# Patient Record
Sex: MALE | Race: WHITE | NOT HISPANIC OR LATINO | Employment: OTHER
[De-identification: names, ages, dates, MRNs, and addresses within clinical notes are randomized per-mention and may not be internally consistent; named-entity substitution may affect disease eponyms.]

---

## 2020-03-10 ENCOUNTER — HEALTH MAINTENANCE LETTER (OUTPATIENT)
Age: 65
End: 2020-03-10

## 2020-12-27 ENCOUNTER — HEALTH MAINTENANCE LETTER (OUTPATIENT)
Age: 65
End: 2020-12-27

## 2021-04-24 ENCOUNTER — HEALTH MAINTENANCE LETTER (OUTPATIENT)
Age: 66
End: 2021-04-24

## 2021-10-04 ENCOUNTER — HEALTH MAINTENANCE LETTER (OUTPATIENT)
Age: 66
End: 2021-10-04

## 2022-05-15 ENCOUNTER — HEALTH MAINTENANCE LETTER (OUTPATIENT)
Age: 67
End: 2022-05-15

## 2022-09-11 ENCOUNTER — HEALTH MAINTENANCE LETTER (OUTPATIENT)
Age: 67
End: 2022-09-11

## 2023-06-03 ENCOUNTER — HEALTH MAINTENANCE LETTER (OUTPATIENT)
Age: 68
End: 2023-06-03

## 2024-02-09 ENCOUNTER — TELEPHONE (OUTPATIENT)
Dept: NEUROSURGERY | Facility: CLINIC | Age: 69
End: 2024-02-09

## 2024-02-23 ASSESSMENT — HOOS JR
HOOS JR TOTAL INTERVAL SCORE: 43.34
BENDING TO THE FLOOR TO PICK UP OBJECT: SEVERE
GOING UP OR DOWN STAIRS: SEVERE
RISING FROM SITTING: MODERATE
WALKING ON UNEVEN SURFACE: SEVERE
SITTING: MODERATE
LYING IN BED (TURNING OVER, MAINTAINING HIP POSITION): MODERATE

## 2024-02-27 NOTE — TELEPHONE ENCOUNTER
Action February 27, 2024 10:08 AM MT   Action Taken Sent a request for imaging from .     DIAGNOSIS: HIP - 2ND OPINION   APPOINTMENT DATE: 02/29/2024   NOTES STATUS DETAILS   OFFICE NOTE from referring provider SELF    OFFICE NOTE from other specialist Care Everywhere  Orthopedics   Neurosurgery   Pain  More..   OPERATIVE REPORT Care Everywhere 11/28/2017 - RT TKA   LABS     INJECTIONS DONE IN RADIOLOGY PACS [facility]:  12/18/2023, 11/13/2023 - Hip    XRAYS (IMAGES & REPORTS) PACS HP:  02/02/2024, 05/24/2022 - Pelvis  12/28/2018 - L Spine

## 2024-02-29 ENCOUNTER — OFFICE VISIT (OUTPATIENT)
Dept: ORTHOPEDICS | Facility: CLINIC | Age: 69
End: 2024-02-29
Payer: COMMERCIAL

## 2024-02-29 ENCOUNTER — PRE VISIT (OUTPATIENT)
Dept: ORTHOPEDICS | Facility: CLINIC | Age: 69
End: 2024-02-29

## 2024-02-29 VITALS — WEIGHT: 180 LBS | BODY MASS INDEX: 25.2 KG/M2 | HEIGHT: 71 IN

## 2024-02-29 DIAGNOSIS — M16.11 PRIMARY LOCALIZED OSTEOARTHRITIS OF RIGHT HIP: Primary | ICD-10-CM

## 2024-02-29 PROCEDURE — 99204 OFFICE O/P NEW MOD 45 MIN: CPT | Performed by: ORTHOPAEDIC SURGERY

## 2024-02-29 NOTE — NURSING NOTE
"Reason For Visit:   Chief Complaint   Patient presents with    Consult     Right hip pain for about a year. Mostly lateral. Groin pain when he rides stationary bike. Radiates into thigh and knee. SI injection and lateral injection - neither helped at all. Spine surgeon said surgery was not indicated.        Ht 1.803 m (5' 11\")   Wt 81.6 kg (180 lb)   BMI 25.10 kg/m           Johana Cordero ATC    "

## 2024-02-29 NOTE — LETTER
2/29/2024         RE: Js Hester  [address] [place] [address]        Dear Colleague,    Thank you for referring your patient, Js Hester, to the [facility] [place]. Please see a copy of my visit note below.    Assessment: This is a 68-year-old male with advanced right hip osteoarthritis.  His symptoms have been long-term and management has included an SI joint injection trochanteric injection and a spine workup.  Given the severity of his symptoms and the advanced nature of his osteoarthritis I do not believe an injection or physical therapy are going to provide meaningful midterm benefit for him.  We discussed the diagnosis and the treatment options.  We discussed the treatment of his hip would be for his groin symptoms.We discussed that given the level of symptoms and radiographic changes that further non-operative management in the form injection and/or physical therapist directed exercises is not mandatory. We spent twenty minutes discussing total hip arthroplasty.  We discussed the implants, the procedure, the risks and benefits, and the post-operative course.  We discussed blood clots, blood clots to the lungs, injury to blood vessels and nerves, dislocation, infection, and leg length difference.  We discussed that as part of the routine part of surgical care a qualified assist may finish closing the wound after I have left the room. All the patients questions were answered to the best of my ability.    Plan: Right total hip arthroplasty with the patient choose to go forward with it      Chief Complaint: Consult (Right hip pain for about a year. Mostly lateral. Groin pain when he rides stationary bike. Radiates into thigh and knee. SI injection and lateral injection - neither helped at all. Spine surgeon said surgery was not indicated. )      Physician:  No ref. provider found    HPI: Js Hester is a 68 year old male who presents today for  evaluation of of his right hip    Location of symptoms: Buttock, lateral hip and into the groin.  Onset: Insidious  Duration of symptoms: About a year  Quality of symptoms: Aching and sharp  Severity: Severe  Alleviating: activity modification  Exacerbating: activities  Previous Treatments: Previous treatments include activity modification, oral pain medication, trochanteric injection, SI joint injection.    RASHEEDA Jr: 43.34  PROMIS Mental:    PROMIS Physical:    PROMIS Total:    UCLA Activity Scale:    MEDICAL HISTORY:   Past Medical History:   Diagnosis Date    Arthritis     Ascending aorta dilation (H24)     per 11/3/15 stress echo, moderate dilated asc aorta of 4.4 cm. Mildly dilated aortic root 4.2 cm.    Coronary artery disease     per 10/16 CT calcium score--total score of 304, predominantly in the distribution of the left coronary    Hyperlipidaemia        Pertinent negatives:  Patient has no history of DVT or PE. Discussed risk factors.    Medications:     Current Outpatient Medications:     aspirin EC 81 MG tablet, Take 1 tablet (81 mg) by mouth daily, Disp: , Rfl:     atorvastatin (LIPITOR) 10 MG tablet, Take 1 tablet (10 mg) by mouth At Bedtime, Disp: 90 tablet, Rfl: 4    Cholecalciferol (VITAMIN D3) 2000 UNITS TABS, Take by mouth daily as needed, Disp: , Rfl:     Allergies: Patient has no known allergies.    SURGICAL HISTORY: No past surgical history on file.    HISTORY:   Family History   Problem Relation Age of Onset    Diabetes Father     Hyperlipidemia Sister     Diabetes Sister     Coronary Artery Disease Paternal Half-Sister     Hyperlipidemia Sister     Cerebrovascular Disease Sister        SOCIAL HISTORY:   Social History     Tobacco Use    Smoking status: Never    Smokeless tobacco: Not on file   Substance Use Topics    Alcohol use: Yes     Alcohol/week: 0.0 standard drinks of alcohol     Comment: 3 drinks, beer and wine, 4 days week       REVIEW OF SYSTEMS:  The comprehensive review of systems  "from the intake form was reviewed with the patient.  No fever, weight change or fatigue. No dry eyes. No oral ulcers, sore throat or voice change. No palpitations, syncope, angina or edema.  No chest pain, excessive sleepiness, shortness of breath or hemoptysis.   No abdominal pain, nausea, vomiting, diarrhea or heartburn.  No skin rash. No focal weakness or numbness. No bleeding or lymphadenopathy. No rhinitis or hives.     Exam:  On physical examination the patient appears the stated age, is in no acute distress, alert and oriented, affect is appropriate, and breathing is non-labored.  Vitals are documented in the EMR and have been reviewed:    Ht 1.803 m (5' 11\")   Wt 81.6 kg (180 lb)   BMI 25.10 kg/m    5' 11\"  Body mass index is 25.1 kg/m .    Rises from chair: Easily  Gait: Mild antalgic with less time in the right  Trendelenburg test:  Gains the exam table: Easily    RIGHT hip subjective: A little irritated  Abd: 20  Add: 10  Flexion: 90  IRF: -10  ERF: 30  Impingement test: Positive groin  Tenderness to palpation: Trochanter right side only    LEFT hip subjective:  Abd:  Add:  Flexion: 100  IRF: 0  ERF: 35  Impingement test: Negative  Tenderness to palpation no    Distal the circulatory, motor, and sensation exam is intact with 5/5 EHL, gastroc-soleus, and tibialis anterior.  Sensation to light touch is intact.  Dorsalis pedis and posterior tibialis pulses are palpable.  There are no sores on the feet, no bruising, and no lymphedema.    Imaging:     XR Pelvis AP W AP/Lat Hip Rt  Order: [id number]  Narrative    EXAM: XR PELVIS AP W AP/LAT HIP RT  LOCATION:  [facility]  DATE: 2/2/2024    INDICATION: Right hip pain.  COMPARISON: 05/24/2022.    IMPRESSION: No radiographic evidence of acute fracture or malalignment. Moderate to severe right hip joint osteoarthritis. Mild osteoarthritis of the pubic symphysis. Mild to moderate osteoarthritis of the left hip joint and bilateral sacroiliac joints.  Exam " End: 02/02/24  9:05 AM    Specimen Collected: 02/02/24  9:05 AM Last Resulted: 02/02/24 12:41 PM   Received From: [facility]  Result Received: 02/27/24 10:03 AM        Marvin Mendoza MD

## 2024-03-05 ENCOUNTER — TELEPHONE (OUTPATIENT)
Dept: ORTHOPEDICS | Facility: CLINIC | Age: 69
End: 2024-03-05

## 2024-03-05 NOTE — TELEPHONE ENCOUNTER
Date Scheduled: 4/11/24  Facility: Surgery Locations: [facility]  Surgeon: Dr. Mendoza   Post-op appointment scheduled:    scheduled?: No  Surgery packet/instructions confirmed received?  Yes  Pre op physical/PAC appointment: Given phone number for PAC  Special Considerations:     Patient is interested in Fast Track as he would like to go home same day.     Yazmin Moore  Surgery Scheduling Coordinator  Ph: [phone]

## 2024-03-15 NOTE — PROGRESS NOTES
Assessment: This is a 68-year-old male with advanced right hip osteoarthritis.  His symptoms have been long-term and management has included an SI joint injection trochanteric injection and a spine workup.  Given the severity of his symptoms and the advanced nature of his osteoarthritis I do not believe an injection or physical therapy are going to provide meaningful midterm benefit for him.  We discussed the diagnosis and the treatment options.  We discussed the treatment of his hip would be for his groin symptoms.We discussed that given the level of symptoms and radiographic changes that further non-operative management in the form injection and/or physical therapist directed exercises is not mandatory. We spent twenty minutes discussing total hip arthroplasty.  We discussed the implants, the procedure, the risks and benefits, and the post-operative course.  We discussed blood clots, blood clots to the lungs, injury to blood vessels and nerves, dislocation, infection, and leg length difference.  We discussed that as part of the routine part of surgical care a qualified assist may finish closing the wound after I have left the room. All the patients questions were answered to the best of my ability.    Plan: Right total hip arthroplasty with the patient choose to go forward with it      Chief Complaint: Consult (Right hip pain for about a year. Mostly lateral. Groin pain when he rides stationary bike. Radiates into thigh and knee. SI injection and lateral injection - neither helped at all. Spine surgeon said surgery was not indicated. )      Physician:  No ref. provider found    HPI: Js Hester is a 68 year old male who presents today for evaluation of of his right hip    Location of symptoms: Buttock, lateral hip and into the groin.  Onset: Insidious  Duration of symptoms: About a year  Quality of symptoms: Aching and sharp  Severity: Severe  Alleviating: activity modification  Exacerbating:  activities  Previous Treatments: Previous treatments include activity modification, oral pain medication, trochanteric injection, SI joint injection.    HOOS Jr: 43.34  PROMIS Mental:    PROMIS Physical:    PROMIS Total:    UCLA Activity Scale:    MEDICAL HISTORY:   Past Medical History:   Diagnosis Date    Arthritis     Ascending aorta dilation (H24)     per 11/3/15 stress echo, moderate dilated asc aorta of 4.4 cm. Mildly dilated aortic root 4.2 cm.    Coronary artery disease     per 10/16 CT calcium score--total score of 304, predominantly in the distribution of the left coronary    Hyperlipidaemia        Pertinent negatives:  Patient has no history of DVT or PE. Discussed risk factors.    Medications:     Current Outpatient Medications:     aspirin EC 81 MG tablet, Take 1 tablet (81 mg) by mouth daily, Disp: , Rfl:     atorvastatin (LIPITOR) 10 MG tablet, Take 1 tablet (10 mg) by mouth At Bedtime, Disp: 90 tablet, Rfl: 4    Cholecalciferol (VITAMIN D3) 2000 UNITS TABS, Take by mouth daily as needed, Disp: , Rfl:     Allergies: Patient has no known allergies.    SURGICAL HISTORY: No past surgical history on file.    HISTORY:   Family History   Problem Relation Age of Onset    Diabetes Father     Hyperlipidemia Sister     Diabetes Sister     Coronary Artery Disease Paternal Half-Sister     Hyperlipidemia Sister     Cerebrovascular Disease Sister        SOCIAL HISTORY:   Social History     Tobacco Use    Smoking status: Never    Smokeless tobacco: Not on file   Substance Use Topics    Alcohol use: Yes     Alcohol/week: 0.0 standard drinks of alcohol     Comment: 3 drinks, beer and wine, 4 days week       REVIEW OF SYSTEMS:  The comprehensive review of systems from the intake form was reviewed with the patient.  No fever, weight change or fatigue. No dry eyes. No oral ulcers, sore throat or voice change. No palpitations, syncope, angina or edema.  No chest pain, excessive sleepiness, shortness of breath or  "hemoptysis.   No abdominal pain, nausea, vomiting, diarrhea or heartburn.  No skin rash. No focal weakness or numbness. No bleeding or lymphadenopathy. No rhinitis or hives.     Exam:  On physical examination the patient appears the stated age, is in no acute distress, alert and oriented, affect is appropriate, and breathing is non-labored.  Vitals are documented in the EMR and have been reviewed:    Ht 1.803 m (5' 11\")   Wt 81.6 kg (180 lb)   BMI 25.10 kg/m    5' 11\"  Body mass index is 25.1 kg/m .    Rises from chair: Easily  Gait: Mild antalgic with less time in the right  Trendelenburg test:  Gains the exam table: Easily    RIGHT hip subjective: A little irritated  Abd: 20  Add: 10  Flexion: 90  IRF: -10  ERF: 30  Impingement test: Positive groin  Tenderness to palpation: Trochanter right side only    LEFT hip subjective:  Abd:  Add:  Flexion: 100  IRF: 0  ERF: 35  Impingement test: Negative  Tenderness to palpation no    Distal the circulatory, motor, and sensation exam is intact with 5/5 EHL, gastroc-soleus, and tibialis anterior.  Sensation to light touch is intact.  Dorsalis pedis and posterior tibialis pulses are palpable.  There are no sores on the feet, no bruising, and no lymphedema.    Imaging:     XR Pelvis AP W AP/Lat Hip Rt  Order: [id number]  Narrative    EXAM: XR PELVIS AP W AP/LAT HIP RT  [facility]ATION:  [facility]  DATE: 2/2/2024    INDICATION: Right hip pain.  COMPARISON: 05/24/2022.    IMPRESSION: No radiographic evidence of acute fracture or malalignment. Moderate to severe right hip joint osteoarthritis. Mild osteoarthritis of the pubic symphysis. Mild to moderate osteoarthritis of the left hip joint and bilateral sacroiliac joints.  Exam End: 02/02/24  9:05 AM    Specimen Collected: 02/02/24  9:05 AM Last Resulted: 02/02/24 12:41 PM   Received From: [facility]  Result Received: 02/27/24 10:03 AM      "

## 2024-03-25 PROBLEM — T46.6X5S ADVERSE EFFECT OF ANTIHYPERLIPIDEMIC AND ANTIARTERIOSCLEROTIC DRUGS, SEQUELA: Status: ACTIVE | Noted: 2021-04-07

## 2024-03-25 PROBLEM — Z96.659 HISTORY OF TOTAL KNEE REPLACEMENT: Status: ACTIVE | Noted: 2017-12-14

## 2024-03-25 PROBLEM — M54.16 LUMBAR RADICULOPATHY: Status: ACTIVE | Noted: 2023-11-28

## 2024-03-25 PROBLEM — M16.11 PRIMARY OSTEOARTHRITIS OF RIGHT HIP: Status: ACTIVE | Noted: 2023-10-25

## 2024-03-25 RX ORDER — OMEGA-3 FATTY ACIDS/FISH OIL 300-1000MG
200-400 CAPSULE ORAL EVERY 6 HOURS PRN
COMMUNITY

## 2024-03-25 NOTE — PROGRESS NOTES
Ortho Navigator Note      Pre-op Date PAC 3/27     Medical Clearance  Cleared     Labs WNR     COVID Test Date No longer indicated      Skin  Intact      Activity: Ambulates independently without assistive device      Equipment Need Patient will bring a walker for discharge. Defer to PT/OT for recs.       Meds to Hold Held all supplements 14 days prior to surgery  Ibuprofen-Hold 24 hr prior to surgery  * Medication recommendations are not intended to be exhaustive; they are limited to common medications that are potentially dangerous if incorrectly managed   NPO Instructions  Instructed to stop solids 8 hr prior to arrival and clears 2 hr prior to arrival.       Pre-op Joint Education Complete? Complete   Discharge Plan Patient has plan to discharge home on DOS as Fast Track   Spouse (Ely) will remain at hospital on DOS to participate in therapy and discharge education. They will then transport patient home    /Transportation Ely will be  and transportation.  is physically able to care for patient.      Barriers to Discharge No barriers to discharge.      Additional Info/   Special Needs : Patient had no unanswered questions or concerns.           03/25/24 1300   Discharge Planning   Patient/Family Anticipates Transition to home with family   Concerns to be Addressed no discharge needs identified   Living Arrangements   People in Home alone   Type of Residence Private Residence   Is your private residence a single family home or apartment? Single family home   Number of Stairs, Within Home, Primary greater than 10 stairs   Stair Railings, Within Home, Primary railings safe and in good condition   Once home, are you able to live on one level? Yes   Which level? Upper Level   Bathroom Shower/Tub Walk-in shower   Equipment Currently Used at Home none   Support System   Support Systems Spouse/Significant Other   Do you have someone available to stay with you one or two nights once you are home? Yes    Medical Clearance   Date of Physical   (TBD. Need to schedule with PAC for FastTrack)   Clinic Name PAC   It is recommended that you call and check with any specialty providers before surgery to see if you need surgical clearance.  Do you see any specialty providers outside of your primary care provider? No   Blood   Known Bleeding Disorder or Coagulopathy No   Does the patient have any Scientology/cultural preferences related to blood products? No   Education   Has the patient scheduled or completed pre-op total joint education, either in class or online, in the last 12 months? Yes   Patient attended total joint pre-op class/received pre-op teaching  online

## 2024-03-25 NOTE — TELEPHONE ENCOUNTER
FUTURE VISIT INFORMATION      SURGERY INFORMATION:  Date: 3/27/2024  Location: [facility]   Surgeon:  Marvin Mendoza MD   Anesthesia Type:  Choice   Procedure: ARTHROPLASTY, RIGHT  HIP, TOTAL       RECORDS REQUESTED FROM:       Primary Care Provider: Enma Fu     Pertinent Medical History:    Most recent EKG+ Tracing: 10/21/2015     Most recent ECHO: 11/3/2015     Most recent Cardiac Stress Test: 11/3/2015 Exercise Stress Echo

## 2024-03-27 ENCOUNTER — PRE VISIT (OUTPATIENT)
Dept: SURGERY | Facility: CLINIC | Age: 69
End: 2024-03-27

## 2024-03-27 ENCOUNTER — VIRTUAL VISIT (OUTPATIENT)
Dept: SURGERY | Facility: CLINIC | Age: 69
End: 2024-03-27
Payer: COMMERCIAL

## 2024-03-27 ENCOUNTER — ANESTHESIA EVENT (OUTPATIENT)
Dept: SURGERY | Facility: CLINIC | Age: 69
End: 2024-03-27
Payer: COMMERCIAL

## 2024-03-27 VITALS — WEIGHT: 180 LBS | BODY MASS INDEX: 25.77 KG/M2 | HEIGHT: 70 IN

## 2024-03-27 DIAGNOSIS — Z01.818 PRE-OP EVALUATION: Primary | ICD-10-CM

## 2024-03-27 PROCEDURE — 99203 OFFICE O/P NEW LOW 30 MIN: CPT | Mod: 95 | Performed by: PHYSICIAN ASSISTANT

## 2024-03-27 RX ORDER — LANOLIN ALCOHOL/MO/W.PET/CERES
3 CREAM (GRAM) TOPICAL
COMMUNITY

## 2024-03-27 ASSESSMENT — ENCOUNTER SYMPTOMS: SEIZURES: 0

## 2024-03-27 ASSESSMENT — LIFESTYLE VARIABLES: TOBACCO_USE: 0

## 2024-03-27 NOTE — H&P
Pre-Operative H & P     CC:  Preoperative exam to assess for increased cardiopulmonary risk while undergoing surgery and anesthesia.    Date of Encounter: 3/27/2024  Primary Care Physician:  Enma Fu     Reason for visit:   Encounter Diagnosis   Name Primary?    Pre-op evaluation Yes       HPI  Js Hester is a 68 year old male who presents for pre-operative H & P in preparation for  Procedure Information       Case: [id number] Date/Time: 04/11/24 0730    Procedure: ARTHROPLASTY, RIGHT  HIP, TOTAL (Right: Hip)    Anesthesia type: Choice    Diagnosis: Primary localized osteoarthritis of right hip [M16.11]    Pre-op diagnosis: Primary localized osteoarthritis of right hip [M16.11]    Location: [facility] / [facility]    Providers: Marvin Mendoza MD            Patient is being evaluated for comorbid conditions of ascending aorta dilation, CAD, dyslipidemia    Mr. Hester has known advanced right hip OA. He has tried SI joint injections and underwent spine workup. He was seen by Dr. Mendoza and is now scheduled for the above procedure.     History is obtained from the patient and chart review    Hx of abnormal bleeding or anti-platelet use: ASA 81 mg      Past Medical History  Past Medical History:   Diagnosis Date    Arthritis     Ascending aorta dilation (H24)     per 11/3/15 stress echo, moderate dilated asc aorta of 4.4 cm. Mildly dilated aortic root 4.2 cm.    Coronary artery disease     per 10/16 CT calcium score--total score of 304, predominantly in the distribution of the left coronary    Hyperlipidaemia        Past Surgical History  Past Surgical History:   Procedure Laterality Date    COLONOSCOPY      TKA         Prior to Admission Medications  Current Outpatient Medications   Medication Sig Dispense Refill    ibuprofen (ADVIL/MOTRIN) 200 MG capsule Take 200 mg by mouth every 6 hours as needed for fever      melatonin 3 MG tablet Take 3 mg by mouth nightly as needed for sleep      UNABLE TO FIND as  needed MEDICATION NAME: Post workout powder/tablet         Allergies  No Known Allergies    Social History  Social History     Socioeconomic History    Marital status:      Spouse name: Not on file    Number of children: Not on file    Years of education: Not on file    Highest education level: Not on file   Occupational History    Not on file   Tobacco Use    Smoking status: Never    Smokeless tobacco: Never   Substance and Sexual Activity    Alcohol use: Yes     Comment: 10-12 drinks/week    Drug use: Yes     Types: Marijuana     Comment: CBD edibles    Sexual activity: Not on file   Other Topics Concern     Service Not Asked    Blood Transfusions Not Asked    Caffeine Concern Yes     Comment: 3 cups coffee per day    Occupational Exposure Not Asked    Hobby Hazards Not Asked    Sleep Concern No    Stress Concern No    Weight Concern No    Special Diet No    Back Care Not Asked    Exercise Yes     Comment: walking, x-[place] skiing    Bike Helmet Not Asked    Seat Belt Not Asked    Self-Exams Not Asked    Parent/sibling w/ CABG, MI or angioplasty before 65F 55M? Not Asked   Social History Narrative    Not on file     Social Determinants of Health     Financial Resource Strain: Not on file   Food Insecurity: Not on file   Transportation Needs: Not on file   Physical Activity: Not on file   Stress: Not on file   Social Connections: Not on file   Interpersonal Safety: Not on file   Housing Stability: Not on file       Family History  Family History   Problem Relation Age of Onset    Diabetes Father     Hyperlipidemia Sister     Diabetes Sister     Hyperlipidemia Sister     Cerebrovascular Disease Sister     Coronary Artery Disease Paternal Half-Sister     Anesthesia Reaction No family hx of     Deep Vein Thrombosis (DVT) No family hx of        Review of Systems  The complete review of systems is negative other than noted in the HPI or here.   Anesthesia Evaluation   Pt has had prior anesthetic.     No  history of anesthetic complications       ROS/MED HX  ENT/Pulmonary:  - neg pulmonary ROS  (-) tobacco use   Neurologic:  - neg neurologic ROS  (-) no seizures and no CVA   Cardiovascular:     (+) Dyslipidemia - -  CAD -  - -   Taking blood thinners                              Previous cardiac testing   Echo: Date: 2017 Results:   CONCLUSION:    1. Limited echocardiogram.    2. Normal LV size and systolic function. Mild concentric LVH.     3. Normal RV size and function.     4. Aortic root measures 4.5 cm and ascending aorta measures 4.3 cm.    5. Prior study report 1/24/17, aortic root measures 4.5 cm and     ascending aorta measures 4.4 cm.     Stress Test:  Date: 2015 Results:  Interpretation Summary  The patient exercised 9:00.  The patient exhibited no chest pain during exercise.  Normal resting wall motion and no stress-induced wall motion abnormality.  This was a normal stress echocardiogram.  Mildly dilated aortic root 4.2 cm. Moderalety dilated ascending aorta 4.4  cm.The aortic valve is trileaflet.    ECG Reviewed:  Date: 2015 Results:  SR  Cath:  Date: Results:      METS/Exercise Tolerance: >4 METS Comment: Riding stationary bike- 45-60 minutes, can walk a mile   Hematologic:  - neg hematologic  ROS  (-) history of blood clots and history of blood transfusion   Musculoskeletal: Comment: Thigh pain and weakness   (+)  arthritis,             GI/Hepatic:  - neg GI/hepatic ROS  (-) GERD   Renal/Genitourinary:  - neg Renal ROS     Endo:  - neg endo ROS  (-) chronic steroid usage   Psychiatric/Substance Use:       Infectious Disease:  - neg infectious disease ROS     Malignancy:  - neg malignancy ROS     Other:            Virtual visit -  No vitals were obtained    Physical Exam  Constitutional: Awake, alert, cooperative, no apparent distress, and appears stated age.  HENT: Normocephalic  Respiratory: non labored breathing   Neurologic: Awake, alert, oriented to name, place and time.   Neuropsychiatric: Calm,  cooperative. Normal affect.      Prior Labs/Diagnostic Studies   All labs and imaging personally reviewed     EKG/ stress test - if available please see in ROS above   No results found.       No data to display                  The patient's records and results personally reviewed by this provider.     Outside records reviewed from: Care Everywhere      Assessment    Js Hester is a 68 year old male seen as a PAC referral for risk assessment and optimization for anesthesia.    Plan/Recommendations  Pt will be optimized for the proposed procedure.  See below for details on the assessment, risk, and preoperative recommendations    NEUROLOGY  - No history of TIA, CVA or seizure  -Post Op delirium risk factors:  No risk identified    ENT  - No current airway concerns.  Will need to be reassessed day of surgery.  Mallampati: Unable to assess  TM: Unable to assess    CARDIAC  - No history of Hypertension and Afib  -h/o ascending aortic dilation (4.4 cm 1/2017). At that time, recommendation made to reapeat echo in 6 months. Ascending aorta stable at 4.3 cm.   -calcium screening elevated at 304 in 2015. Stress test without ischemia. Aggressive lifestyle modifications recommended at that time  -denies cardiac symptoms - reports he is riding a stationary bike 45-60 minutes at a time and can walk a mile without exertional symptoms  -patient reports he stopped taking ASA and statin years ago. I encouraged him to consider restarting for protection as he had an elevated coronary calcium score in the past.    - METS (Metabolic Equivalents)  Patient performs 4 or more METS exercise without symptoms            Total Score: 0      RCRI-Low risk: Class 2 0.9% complication rate            Total Score: 1    RCRI: CAD        PULMONARY  JESSICA Low Risk            Total Score: 2    JESSICA: Over 50 ys old    JESSICA: Male      - Denies asthma or inhaler use  - Tobacco History    History   Smoking Status    Never   Smokeless Tobacco    Never  "      GI  PONV Medium Risk  Total Score: 2           1 AN PONV: Patient is not a current smoker    1 AN PONV: Intended Post Op Opioids        /RENAL  - Baseline Creatinine WNL 2021, will update prior to the procedure    ENDOCRINE    - BMI: Estimated body mass index is 25.83 kg/m  as calculated from the following:    Height as of this encounter: 1.778 m (5' 10\").    Weight as of this encounter: 81.6 kg (180 lb).  Overweight (BMI 25.0-29.9)  - No history of Diabetes Mellitus    HEME  VTE Low Risk 0.5%            Total Score: 3    VTE: Greater than 59 yrs old    VTE: Male      -will update CBC prior to surgery    MSK  -OA with the above procedure planned     Different anesthesia methods/types have been discussed with the patient, but they are aware that the final plan will be decided by the assigned anesthesia provider on the date of service.    The patient is optimized for their procedure. AVS with information on surgery time/arrival time, meds and NPO status given by nursing staff. No further diagnostic testing indicated.    Please refer to the physical examination documented by the anesthesiologist in the anesthesia record on the day of surgery.    Video-Visit Details    Type of service:  Video Visit    Provider received verbal consent for a Video Visit from the patient? Yes     Originating Location (pt. Location): Home    Distant Location (provider location):  Off-site  Mode of Communication:  Video Conference via [employer]  On the day of service:     Prep time: 12 minutes  Visit time: 13 minutes  Documentation time: 7 minutes  ------------------------------------------  Total time: 22 minutes      Mitali Ace PA-C  Preoperative Assessment Center  [facility]  Clinic and Surgery Center  Phone: [phone]  Fax: [phone]    "

## 2024-03-27 NOTE — PATIENT INSTRUCTIONS
Preparing for Your Surgery      Name:  Js Hester   MRN:  [id number]   :  1955   Today's Date:  3/27/2024       Arriving for surgery:  Surgery date:  24  Arrival time:  5:30 am  Surgery time: 7:30 am    Please come to:     Please come to:      [facility] [address]  [address] [place]  [address]  The Green Ramp for patients and visitors is located beneath the [facility]. The parking facility entrance is at the intersection of [address] [place] [address] and [address]. Patients and visitors who self-park will receive the reduced hospital parking rate (no ticket validation needed).  [employer] parking, located at the [facility] main entrance on [address] [place] [address], is available Monday - Friday from 7 am to 3:30 pm.  Discounted parking pass options can be purchased from  attendants during business hours.  -Check in at the security desk in the [facility] ([facility]) Lobby. They will direct you to the correct elevators.  -Proceed to the 3rd floor, check in at the Adult Surgery Waiting Lounge. [phone]  If you are in need of directions, a wheelchair or escort please stop at the Information Desk in the lobby.  Inform the information person that you are here for surgery; a wheelchair and escort to [address] will be provided.   An escort to the Adult Surgery Waiting Lounge will be provided.    What can I eat or drink?  -  You may eat and drink normally up to 8 hours prior to arrival time. (Until 9:30 pm)  -  You may have clear liquids until 2 hours prior to arrival time. (Until 3:30 am)    Examples of clear liquids:  Water  Clear broth  Juices (apple, white grape, white cranberry  and cider) without pulp  Noncarbonated, powder based beverages  (lemonade and Malachi-Aid)  Sodas (Sprite, 7-Up, ginger ale and seltzer)  Coffee or tea (without milk or cream)  Gatorade    -   No Alcohol or cannabis products for at least 24 hours before surgery.     Which medicines can I take?    Hold Aspirin for 7 days before surgery.   Hold Multivitamins for 7 days before surgery.  Hold Supplements for 7 days before surgery.  Hold Ibuprofen (Advil, Motrin) for 1 day(s) before surgery--unless otherwise directed by surgeon.  Hold Naproxen (Aleve) for 4 days before surgery.    -  DO NOT take these medications the day of surgery:  Post workout powder    -  PLEASE TAKE these medications per your usual routine:  Melatonin        OPTIMAL RECOVERY AFTER SURGERY - start this the day before surgery       Begin hydrating yourself by drinking at least 8-10 glasses of clear liquids for 24 hours before surgery:      Suggested clear liquids:   Water    Clear Juices   Clear Broth   Non- carbonated beverages    (Crystal Light or Malachi Aid)   Sodas    (Sprite, 7 up, ginger ale, seltzer)   Gatorade            Drink clear liquids up until 4 hours before your surgery.       We would like you to purchase a drink such as Gatorade or Ensure Clear (not the milkshake type).  Drink this before bedtime the night before surgery - drink between 8-10 ounces or until you feel hydrated.        Keeping well hydrated leads to your veins being plump, you wake up faster, and you are less likely to be nauseated. Start drinking water as soon as you can after surgery and advance to clear liquids and food as tolerated.  IV fluids contain salt, drinking fluids will minimize the amount of IV fluids you need and decrease the amount of salt you get.                 The most common reason for the patient to be readmitted is dehydration. Staying hydrated after you go home from the hospital is very important.  Ensure or Ensure Clear are good options to keep you hydrated.      How do I prepare myself?  - Please take 2 showers (one the night prior to surgery and one the morning of surgery) using Scrubcare or Hibiclens soap.    Use this soap only from  the neck to your toes.     Leave the soap on your skin for one minute--then rinse thoroughly.      You may use your own shampoo and conditioner. No other hair products.   - Please remove all jewelry and body piercings.  - No lotions, deodorants or fragrance.  - No makeup or fingernail polish.   - Bring your ID and insurance card.    -If you use a CPAP machine, please bring the CPAP machine, tubing, and mask to hospital.    -If you have a Deep Brain Stimulator, Spinal Cord Stimulator, or any Neuro Stimulator device---you must bring the remote control to the hospital.      ALL PATIENTS GOING HOME THE SAME DAY OF SURGERY ARE REQUIRED TO HAVE A RESPONSIBLE ADULT TO DRIVE AND BE IN ATTENDANCE WITH THEM FOR 24 HOURS FOLLOWING SURGERY.    Covid testing policy as of 12/06/2022  Your surgeon will notify and schedule you for a COVID test if one is needed before surgery--please direct any questions or COVID symptoms to your surgeon      Questions or Concerns:    - For any questions regarding the day of surgery or your hospital stay, please contact the Pre Admission Nursing Office at [phone].       - If you have health changes between today and your surgery, please call your surgeon.       - For questions after surgery, please call your surgeons office.           Current Visitor Guidelines    You may have 2 visitors in the pre op area.    Visiting hours: 8 a.m. to 8:30 p.m.    Patients confirmed or suspected to have symptoms of COVID 19 or flu:     No visitors allowed for adult patients.   Children (under age 18) can have 1 named visitor.     People who are sick or showing symptoms of COVID 19 or flu:    Are not allowed to visit patients--we can only make exceptions in special situations.       Please follow these guidelines for your visit:          Please maintain social distance          Masking is optional--however at times you may be asked to wear a mask for the safety of yourself and others     Clean your hands with  alcohol hand . Do this when you arrive at and leave the building and patient room,    And again after you touch your mask or anything in the room.     Go directly to and from the room you are visiting.     Stay in the patient s room during your visit. Limit going to other places in the hospital as much as possible     Leave bags and jackets at home or in the car.     For everyone s health, please don t come and go during your visit. That includes for smoking   during your visit.

## 2024-03-27 NOTE — PROGRESS NOTES
Js is a 68 year old who is being evaluated via a billable video visit.    willie Birch   Js is a 68 year old, presenting for the following health issues:  Pre-Op Exam (/)    JOSEY Lee LPN

## 2024-04-05 ENCOUNTER — PREP FOR PROCEDURE (OUTPATIENT)
Dept: ORTHOPEDICS | Facility: CLINIC | Age: 69
End: 2024-04-05

## 2024-04-05 ENCOUNTER — LAB (OUTPATIENT)
Dept: LAB | Facility: CLINIC | Age: 69
End: 2024-04-05
Payer: COMMERCIAL

## 2024-04-05 DIAGNOSIS — Z01.818 PRE-OP EVALUATION: ICD-10-CM

## 2024-04-05 LAB
ANION GAP SERPL CALCULATED.3IONS-SCNC: 10 MMOL/L (ref 7–15)
BUN SERPL-MCNC: 17.4 MG/DL (ref 8–23)
CALCIUM SERPL-MCNC: 9.4 MG/DL (ref 8.8–10.2)
CHLORIDE SERPL-SCNC: 105 MMOL/L (ref 98–107)
CREAT SERPL-MCNC: 0.97 MG/DL (ref 0.67–1.17)
DEPRECATED HCO3 PLAS-SCNC: 25 MMOL/L (ref 22–29)
EGFRCR SERPLBLD CKD-EPI 2021: 84 ML/MIN/1.73M2
ERYTHROCYTE [DISTWIDTH] IN BLOOD BY AUTOMATED COUNT: 13.3 % (ref 10–15)
GLUCOSE SERPL-MCNC: 111 MG/DL (ref 70–99)
HCT VFR BLD AUTO: 48.7 % (ref 40–53)
HGB BLD-MCNC: 16.3 G/DL (ref 13.3–17.7)
MCH RBC QN AUTO: 30.3 PG (ref 26.5–33)
MCHC RBC AUTO-ENTMCNC: 33.5 G/DL (ref 31.5–36.5)
MCV RBC AUTO: 91 FL (ref 78–100)
PLATELET # BLD AUTO: 213 10E3/UL (ref 150–450)
POTASSIUM SERPL-SCNC: 4.1 MMOL/L (ref 3.4–5.3)
RBC # BLD AUTO: 5.38 10E6/UL (ref 4.4–5.9)
SODIUM SERPL-SCNC: 140 MMOL/L (ref 135–145)
WBC # BLD AUTO: 5.2 10E3/UL (ref 4–11)

## 2024-04-05 PROCEDURE — 36415 COLL VENOUS BLD VENIPUNCTURE: CPT

## 2024-04-05 PROCEDURE — 80048 BASIC METABOLIC PNL TOTAL CA: CPT

## 2024-04-05 PROCEDURE — 85027 COMPLETE CBC AUTOMATED: CPT

## 2024-04-09 ENCOUNTER — TELEPHONE (OUTPATIENT)
Dept: ORTHOPEDICS | Facility: CLINIC | Age: 69
End: 2024-04-09
Payer: COMMERCIAL

## 2024-04-09 NOTE — TELEPHONE ENCOUNTER
Writer called pt back and confirmed that arrival time is set for 5:30 am. Pt asked about coffee 4 hrs prior to surgery. Writer informed pt to follow what it states in the surgery packet they received at least not 8 hour prior to but if packet says 12 hours prior then pt should follow that.     Jen Lobato LPN

## 2024-04-09 NOTE — TELEPHONE ENCOUNTER
[facility] Call Center    Phone Message    May a detailed message be left on voicemail: yes     Reason for Call: Other: Patient calling to speak with Joselin regarding surgery info what time he should be there etc     Action Taken: Message routed to:  [facility] ([facility]): Reji     Travel Screening: Not Applicable

## 2024-04-10 ENCOUNTER — TELEPHONE (OUTPATIENT)
Dept: ORTHOPEDICS | Facility: CLINIC | Age: 69
End: 2024-04-10
Payer: COMMERCIAL

## 2024-04-10 NOTE — TELEPHONE ENCOUNTER
[facility] Call Center    Phone Message    May a detailed message be left on voicemail: yes     Reason for Call: Other: Patient requesting a call back from Joselin his care coordinator. He would like to know when he should arrive for surgery tomorrow.     Action Taken: Message routed to:  [facility] ([facility]): Ortho    Travel Screening: Not Applicable

## 2024-04-11 ENCOUNTER — APPOINTMENT (OUTPATIENT)
Dept: OCCUPATIONAL THERAPY | Facility: CLINIC | Age: 69
End: 2024-04-11
Attending: ORTHOPAEDIC SURGERY
Payer: COMMERCIAL

## 2024-04-11 ENCOUNTER — ANESTHESIA (OUTPATIENT)
Dept: SURGERY | Facility: CLINIC | Age: 69
End: 2024-04-11
Payer: COMMERCIAL

## 2024-04-11 ENCOUNTER — APPOINTMENT (OUTPATIENT)
Dept: GENERAL RADIOLOGY | Facility: CLINIC | Age: 69
End: 2024-04-11
Payer: COMMERCIAL

## 2024-04-11 ENCOUNTER — APPOINTMENT (OUTPATIENT)
Dept: PHYSICAL THERAPY | Facility: CLINIC | Age: 69
End: 2024-04-11
Attending: ORTHOPAEDIC SURGERY
Payer: COMMERCIAL

## 2024-04-11 ENCOUNTER — HOSPITAL ENCOUNTER (OUTPATIENT)
Facility: CLINIC | Age: 69
Discharge: HOME | End: 2024-04-11
Attending: ORTHOPAEDIC SURGERY | Admitting: ORTHOPAEDIC SURGERY
Payer: COMMERCIAL

## 2024-04-11 VITALS
RESPIRATION RATE: 12 BRPM | HEIGHT: 70 IN | BODY MASS INDEX: 26.64 KG/M2 | DIASTOLIC BLOOD PRESSURE: 92 MMHG | TEMPERATURE: 97.9 F | SYSTOLIC BLOOD PRESSURE: 124 MMHG | OXYGEN SATURATION: 96 % | WEIGHT: 186.07 LBS | HEART RATE: 86 BPM

## 2024-04-11 DIAGNOSIS — M16.11 PRIMARY OSTEOARTHRITIS OF RIGHT HIP: Primary | ICD-10-CM

## 2024-04-11 LAB
GLUCOSE BLDC GLUCOMTR-MCNC: 124 MG/DL (ref 70–99)
GLUCOSE BLDC GLUCOMTR-MCNC: 126 MG/DL (ref 70–99)

## 2024-04-11 PROCEDURE — 258N000001 HC RX 258: Performed by: ORTHOPAEDIC SURGERY

## 2024-04-11 PROCEDURE — 97110 THERAPEUTIC EXERCISES: CPT | Mod: GP

## 2024-04-11 PROCEDURE — 250N000009 HC RX 250: Performed by: NURSE ANESTHETIST, CERTIFIED REGISTERED

## 2024-04-11 PROCEDURE — 999N000141 HC STATISTIC PRE-PROCEDURE NURSING ASSESSMENT: Performed by: ORTHOPAEDIC SURGERY

## 2024-04-11 PROCEDURE — 97116 GAIT TRAINING THERAPY: CPT | Mod: GP

## 2024-04-11 PROCEDURE — 250N000011 HC RX IP 250 OP 636

## 2024-04-11 PROCEDURE — 97165 OT EVAL LOW COMPLEX 30 MIN: CPT | Mod: GO

## 2024-04-11 PROCEDURE — 97535 SELF CARE MNGMENT TRAINING: CPT | Mod: GO

## 2024-04-11 PROCEDURE — 250N000011 HC RX IP 250 OP 636: Performed by: ANESTHESIOLOGY

## 2024-04-11 PROCEDURE — 27130 TOTAL HIP ARTHROPLASTY: CPT | Performed by: ANESTHESIOLOGY

## 2024-04-11 PROCEDURE — 999N000065 XR PELVIS AND HIP PORTABLE RIGHT 1 VIEW

## 2024-04-11 PROCEDURE — 27130 TOTAL HIP ARTHROPLASTY: CPT | Performed by: NURSE ANESTHETIST, CERTIFIED REGISTERED

## 2024-04-11 PROCEDURE — 82962 GLUCOSE BLOOD TEST: CPT

## 2024-04-11 PROCEDURE — 250N000011 HC RX IP 250 OP 636: Performed by: STUDENT IN AN ORGANIZED HEALTH CARE EDUCATION/TRAINING PROGRAM

## 2024-04-11 PROCEDURE — 250N000009 HC RX 250: Performed by: ORTHOPAEDIC SURGERY

## 2024-04-11 PROCEDURE — C1776 JOINT DEVICE (IMPLANTABLE): HCPCS | Performed by: ORTHOPAEDIC SURGERY

## 2024-04-11 PROCEDURE — 710N000012 HC RECOVERY PHASE 2, PER MINUTE: Performed by: ORTHOPAEDIC SURGERY

## 2024-04-11 PROCEDURE — 370N000017 HC ANESTHESIA TECHNICAL FEE, PER MIN: Performed by: ORTHOPAEDIC SURGERY

## 2024-04-11 PROCEDURE — 258N000003 HC RX IP 258 OP 636: Performed by: NURSE ANESTHETIST, CERTIFIED REGISTERED

## 2024-04-11 PROCEDURE — 710N000009 HC RECOVERY PHASE 1, LEVEL 1, PER MIN: Performed by: ORTHOPAEDIC SURGERY

## 2024-04-11 PROCEDURE — 272N000001 HC OR GENERAL SUPPLY STERILE: Performed by: ORTHOPAEDIC SURGERY

## 2024-04-11 PROCEDURE — 250N000011 HC RX IP 250 OP 636: Performed by: NURSE ANESTHETIST, CERTIFIED REGISTERED

## 2024-04-11 PROCEDURE — 97530 THERAPEUTIC ACTIVITIES: CPT | Mod: GP

## 2024-04-11 PROCEDURE — 97161 PT EVAL LOW COMPLEX 20 MIN: CPT | Mod: GP

## 2024-04-11 PROCEDURE — 258N000003 HC RX IP 258 OP 636

## 2024-04-11 PROCEDURE — 250N000013 HC RX MED GY IP 250 OP 250 PS 637

## 2024-04-11 PROCEDURE — C1713 ANCHOR/SCREW BN/BN,TIS/BN: HCPCS | Performed by: ORTHOPAEDIC SURGERY

## 2024-04-11 PROCEDURE — 360N000077 HC SURGERY LEVEL 4, PER MIN: Performed by: ORTHOPAEDIC SURGERY

## 2024-04-11 DEVICE — SYNERGY POROUS HIGH OFFSET FEMORAL                                    COMPONENT SZ 14
Type: IMPLANTABLE DEVICE | Site: HIP | Status: FUNCTIONAL
Brand: SYNERGY

## 2024-04-11 DEVICE — R3 3 HOLE ACETABULAR SHELL 52MM
Type: IMPLANTABLE DEVICE | Site: HIP | Status: FUNCTIONAL
Brand: R3 ACETABULAR

## 2024-04-11 DEVICE — R3 0 DEGREE XLPE ACETABULAR LINER                                    36MM INNER DIAMETER X OUTER DIAMETER 52MM
Type: IMPLANTABLE DEVICE | Site: HIP | Status: FUNCTIONAL
Brand: R3

## 2024-04-11 DEVICE — COBALT CHROME 12/14 TAPER FEMORAL                                    HEAD 36MM +4: Type: IMPLANTABLE DEVICE | Site: HIP | Status: FUNCTIONAL

## 2024-04-11 DEVICE — REFLECTION SPHERICAL HEAD SCREW 40MM
Type: IMPLANTABLE DEVICE | Site: HIP | Status: FUNCTIONAL
Brand: REFLECTION

## 2024-04-11 DEVICE — REFLECTION SPHERICAL HEAD SCREW 25MM
Type: IMPLANTABLE DEVICE | Site: HIP | Status: FUNCTIONAL
Brand: REFLECTION

## 2024-04-11 RX ORDER — ONDANSETRON 4 MG/1
4 TABLET, ORALLY DISINTEGRATING ORAL EVERY 30 MIN PRN
Status: DISCONTINUED | OUTPATIENT
Start: 2024-04-11 | End: 2024-04-11 | Stop reason: HOSPADM

## 2024-04-11 RX ORDER — TRANEXAMIC ACID 650 MG/1
1950 TABLET ORAL ONCE
Status: COMPLETED | OUTPATIENT
Start: 2024-04-11 | End: 2024-04-11

## 2024-04-11 RX ORDER — ONDANSETRON 4 MG/1
4 TABLET, ORALLY DISINTEGRATING ORAL EVERY 8 HOURS PRN
Qty: 10 TABLET | Refills: 0 | Status: SHIPPED | OUTPATIENT
Start: 2024-04-11 | End: 2024-04-21

## 2024-04-11 RX ORDER — ONDANSETRON 2 MG/ML
4 INJECTION INTRAMUSCULAR; INTRAVENOUS EVERY 6 HOURS PRN
Status: DISCONTINUED | OUTPATIENT
Start: 2024-04-11 | End: 2024-04-11 | Stop reason: HOSPADM

## 2024-04-11 RX ORDER — BISACODYL 10 MG
10 SUPPOSITORY, RECTAL RECTAL DAILY PRN
Status: DISCONTINUED | OUTPATIENT
Start: 2024-04-11 | End: 2024-04-11 | Stop reason: HOSPADM

## 2024-04-11 RX ORDER — SODIUM CHLORIDE, SODIUM LACTATE, POTASSIUM CHLORIDE, CALCIUM CHLORIDE 600; 310; 30; 20 MG/100ML; MG/100ML; MG/100ML; MG/100ML
INJECTION, SOLUTION INTRAVENOUS CONTINUOUS
Status: DISCONTINUED | OUTPATIENT
Start: 2024-04-11 | End: 2024-04-11 | Stop reason: HOSPADM

## 2024-04-11 RX ORDER — HYDROMORPHONE HYDROCHLORIDE 1 MG/ML
0.2 INJECTION, SOLUTION INTRAMUSCULAR; INTRAVENOUS; SUBCUTANEOUS
Status: DISCONTINUED | OUTPATIENT
Start: 2024-04-11 | End: 2024-04-11 | Stop reason: HOSPADM

## 2024-04-11 RX ORDER — LIDOCAINE 40 MG/G
CREAM TOPICAL
Status: DISCONTINUED | OUTPATIENT
Start: 2024-04-11 | End: 2024-04-11 | Stop reason: HOSPADM

## 2024-04-11 RX ORDER — CEFAZOLIN SODIUM/WATER 2 G/20 ML
2 SYRINGE (ML) INTRAVENOUS SEE ADMIN INSTRUCTIONS
Status: DISCONTINUED | OUTPATIENT
Start: 2024-04-11 | End: 2024-04-11 | Stop reason: HOSPADM

## 2024-04-11 RX ORDER — ACETAMINOPHEN 325 MG/1
975 TABLET ORAL EVERY 8 HOURS
Status: DISCONTINUED | OUTPATIENT
Start: 2024-04-11 | End: 2024-04-11 | Stop reason: HOSPADM

## 2024-04-11 RX ORDER — ONDANSETRON 2 MG/ML
INJECTION INTRAMUSCULAR; INTRAVENOUS PRN
Status: DISCONTINUED | OUTPATIENT
Start: 2024-04-11 | End: 2024-04-11

## 2024-04-11 RX ORDER — AMOXICILLIN 250 MG
1-2 CAPSULE ORAL 2 TIMES DAILY
Qty: 30 TABLET | Refills: 0 | Status: SHIPPED | OUTPATIENT
Start: 2024-04-11 | End: 2024-04-21

## 2024-04-11 RX ORDER — OXYCODONE HYDROCHLORIDE 5 MG/1
5-10 TABLET ORAL EVERY 4 HOURS PRN
Qty: 26 TABLET | Refills: 0 | Status: SHIPPED | OUTPATIENT
Start: 2024-04-11 | End: 2024-04-25

## 2024-04-11 RX ORDER — EPHEDRINE SULFATE 50 MG/ML
INJECTION, SOLUTION INTRAMUSCULAR; INTRAVENOUS; SUBCUTANEOUS PRN
Status: DISCONTINUED | OUTPATIENT
Start: 2024-04-11 | End: 2024-04-11

## 2024-04-11 RX ORDER — FENTANYL CITRATE 50 UG/ML
25 INJECTION, SOLUTION INTRAMUSCULAR; INTRAVENOUS EVERY 5 MIN PRN
Status: DISCONTINUED | OUTPATIENT
Start: 2024-04-11 | End: 2024-04-11 | Stop reason: HOSPADM

## 2024-04-11 RX ORDER — ACETAMINOPHEN 325 MG/1
650 TABLET ORAL EVERY 4 HOURS PRN
Qty: 100 TABLET | Refills: 0 | Status: SHIPPED | OUTPATIENT
Start: 2024-04-11 | End: 2024-04-23

## 2024-04-11 RX ORDER — ACETAMINOPHEN 325 MG/1
975 TABLET ORAL ONCE
Status: COMPLETED | OUTPATIENT
Start: 2024-04-11 | End: 2024-04-11

## 2024-04-11 RX ORDER — ACETAMINOPHEN 325 MG/1
650 TABLET ORAL EVERY 4 HOURS PRN
Status: DISCONTINUED | OUTPATIENT
Start: 2024-04-14 | End: 2024-04-11 | Stop reason: HOSPADM

## 2024-04-11 RX ORDER — FENTANYL CITRATE 50 UG/ML
50 INJECTION, SOLUTION INTRAMUSCULAR; INTRAVENOUS EVERY 5 MIN PRN
Status: DISCONTINUED | OUTPATIENT
Start: 2024-04-11 | End: 2024-04-11 | Stop reason: HOSPADM

## 2024-04-11 RX ORDER — PROPOFOL 10 MG/ML
INJECTION, EMULSION INTRAVENOUS PRN
Status: DISCONTINUED | OUTPATIENT
Start: 2024-04-11 | End: 2024-04-11

## 2024-04-11 RX ORDER — OXYCODONE HYDROCHLORIDE 5 MG/1
5 TABLET ORAL EVERY 4 HOURS PRN
Status: DISCONTINUED | OUTPATIENT
Start: 2024-04-11 | End: 2024-04-11 | Stop reason: HOSPADM

## 2024-04-11 RX ORDER — HYDROMORPHONE HYDROCHLORIDE 1 MG/ML
0.4 INJECTION, SOLUTION INTRAMUSCULAR; INTRAVENOUS; SUBCUTANEOUS
Status: DISCONTINUED | OUTPATIENT
Start: 2024-04-11 | End: 2024-04-11 | Stop reason: HOSPADM

## 2024-04-11 RX ORDER — OXYCODONE HYDROCHLORIDE 10 MG/1
10 TABLET ORAL EVERY 4 HOURS PRN
Status: DISCONTINUED | OUTPATIENT
Start: 2024-04-11 | End: 2024-04-11 | Stop reason: HOSPADM

## 2024-04-11 RX ORDER — POLYETHYLENE GLYCOL 3350 17 G/17G
17 POWDER, FOR SOLUTION ORAL DAILY
Status: DISCONTINUED | OUTPATIENT
Start: 2024-04-12 | End: 2024-04-11 | Stop reason: HOSPADM

## 2024-04-11 RX ORDER — ONDANSETRON 2 MG/ML
4 INJECTION INTRAMUSCULAR; INTRAVENOUS EVERY 30 MIN PRN
Status: DISCONTINUED | OUTPATIENT
Start: 2024-04-11 | End: 2024-04-11 | Stop reason: HOSPADM

## 2024-04-11 RX ORDER — ASPIRIN 81 MG/1
81 TABLET ORAL 2 TIMES DAILY
Qty: 60 TABLET | Refills: 0 | Status: SHIPPED | OUTPATIENT
Start: 2024-04-11 | End: 2024-04-21

## 2024-04-11 RX ORDER — POLYETHYLENE GLYCOL 3350 17 G/17G
1 POWDER, FOR SOLUTION ORAL DAILY
Qty: 7 PACKET | Refills: 0 | Status: SHIPPED | OUTPATIENT
Start: 2024-04-11 | End: 2024-04-25

## 2024-04-11 RX ORDER — NALOXONE HYDROCHLORIDE 0.4 MG/ML
0.1 INJECTION, SOLUTION INTRAMUSCULAR; INTRAVENOUS; SUBCUTANEOUS
Status: DISCONTINUED | OUTPATIENT
Start: 2024-04-11 | End: 2024-04-11 | Stop reason: HOSPADM

## 2024-04-11 RX ORDER — CEFAZOLIN SODIUM/WATER 2 G/20 ML
2 SYRINGE (ML) INTRAVENOUS
Status: COMPLETED | OUTPATIENT
Start: 2024-04-11 | End: 2024-04-11

## 2024-04-11 RX ORDER — PROCHLORPERAZINE MALEATE 5 MG
5 TABLET ORAL EVERY 6 HOURS PRN
Status: DISCONTINUED | OUTPATIENT
Start: 2024-04-11 | End: 2024-04-11 | Stop reason: HOSPADM

## 2024-04-11 RX ORDER — SODIUM CHLORIDE, SODIUM LACTATE, POTASSIUM CHLORIDE, CALCIUM CHLORIDE 600; 310; 30; 20 MG/100ML; MG/100ML; MG/100ML; MG/100ML
INJECTION, SOLUTION INTRAVENOUS CONTINUOUS PRN
Status: DISCONTINUED | OUTPATIENT
Start: 2024-04-11 | End: 2024-04-11

## 2024-04-11 RX ORDER — ONDANSETRON 4 MG/1
4 TABLET, ORALLY DISINTEGRATING ORAL EVERY 6 HOURS PRN
Status: DISCONTINUED | OUTPATIENT
Start: 2024-04-11 | End: 2024-04-11 | Stop reason: HOSPADM

## 2024-04-11 RX ORDER — CEFAZOLIN SODIUM/WATER 2 G/20 ML
2 SYRINGE (ML) INTRAVENOUS ONCE
Status: COMPLETED | OUTPATIENT
Start: 2024-04-11 | End: 2024-04-11

## 2024-04-11 RX ORDER — PROPOFOL 10 MG/ML
INJECTION, EMULSION INTRAVENOUS CONTINUOUS PRN
Status: DISCONTINUED | OUTPATIENT
Start: 2024-04-11 | End: 2024-04-11

## 2024-04-11 RX ORDER — AMOXICILLIN 250 MG
1 CAPSULE ORAL 2 TIMES DAILY
Status: DISCONTINUED | OUTPATIENT
Start: 2024-04-11 | End: 2024-04-11 | Stop reason: HOSPADM

## 2024-04-11 RX ORDER — ASPIRIN 81 MG/1
81 TABLET ORAL 2 TIMES DAILY
Status: DISCONTINUED | OUTPATIENT
Start: 2024-04-11 | End: 2024-04-11 | Stop reason: HOSPADM

## 2024-04-11 RX ORDER — CEFAZOLIN SODIUM 2 G/100ML
2 INJECTION, SOLUTION INTRAVENOUS EVERY 8 HOURS
Status: DISCONTINUED | OUTPATIENT
Start: 2024-04-11 | End: 2024-04-11

## 2024-04-11 RX ORDER — BUPIVACAINE HYDROCHLORIDE 7.5 MG/ML
INJECTION, SOLUTION INTRASPINAL
Status: DISCONTINUED | OUTPATIENT
Start: 2024-04-11 | End: 2024-04-11

## 2024-04-11 RX ORDER — LIDOCAINE HYDROCHLORIDE 20 MG/ML
INJECTION, SOLUTION INFILTRATION; PERINEURAL PRN
Status: DISCONTINUED | OUTPATIENT
Start: 2024-04-11 | End: 2024-04-11

## 2024-04-11 RX ORDER — MAGNESIUM HYDROXIDE 1200 MG/15ML
LIQUID ORAL PRN
Status: DISCONTINUED | OUTPATIENT
Start: 2024-04-11 | End: 2024-04-11 | Stop reason: HOSPADM

## 2024-04-11 RX ORDER — HYDROMORPHONE HYDROCHLORIDE 1 MG/ML
0.2 INJECTION, SOLUTION INTRAMUSCULAR; INTRAVENOUS; SUBCUTANEOUS EVERY 5 MIN PRN
Status: DISCONTINUED | OUTPATIENT
Start: 2024-04-11 | End: 2024-04-11 | Stop reason: HOSPADM

## 2024-04-11 RX ORDER — HYDROMORPHONE HYDROCHLORIDE 1 MG/ML
0.4 INJECTION, SOLUTION INTRAMUSCULAR; INTRAVENOUS; SUBCUTANEOUS EVERY 5 MIN PRN
Status: DISCONTINUED | OUTPATIENT
Start: 2024-04-11 | End: 2024-04-11 | Stop reason: HOSPADM

## 2024-04-11 RX ADMIN — EPHEDRINE SULFATE 5 MG: 5 INJECTION INTRAVENOUS at 09:11

## 2024-04-11 RX ADMIN — PROPOFOL 50 MG: 10 INJECTION, EMULSION INTRAVENOUS at 09:05

## 2024-04-11 RX ADMIN — ACETAMINOPHEN 975 MG: 325 TABLET ORAL at 06:25

## 2024-04-11 RX ADMIN — MIDAZOLAM 0.5 MG: 1 INJECTION INTRAMUSCULAR; INTRAVENOUS at 07:34

## 2024-04-11 RX ADMIN — Medication 2 G: at 07:30

## 2024-04-11 RX ADMIN — SODIUM CHLORIDE, POTASSIUM CHLORIDE, SODIUM LACTATE AND CALCIUM CHLORIDE: 600; 310; 30; 20 INJECTION, SOLUTION INTRAVENOUS at 07:25

## 2024-04-11 RX ADMIN — LIDOCAINE HYDROCHLORIDE 60 MG: 20 INJECTION, SOLUTION INFILTRATION; PERINEURAL at 07:44

## 2024-04-11 RX ADMIN — PHENYLEPHRINE HYDROCHLORIDE 100 MCG: 10 INJECTION INTRAVENOUS at 09:12

## 2024-04-11 RX ADMIN — PHENYLEPHRINE HYDROCHLORIDE 100 MCG: 10 INJECTION INTRAVENOUS at 08:12

## 2024-04-11 RX ADMIN — PHENYLEPHRINE HYDROCHLORIDE 0.3 MCG/KG/MIN: 10 INJECTION INTRAVENOUS at 08:15

## 2024-04-11 RX ADMIN — OXYCODONE HYDROCHLORIDE 5 MG: 5 TABLET ORAL at 10:55

## 2024-04-11 RX ADMIN — TRANEXAMIC ACID 1950 MG: 650 TABLET ORAL at 06:25

## 2024-04-11 RX ADMIN — ACETAMINOPHEN 975 MG: 325 TABLET ORAL at 13:02

## 2024-04-11 RX ADMIN — Medication 2 G: at 15:25

## 2024-04-11 RX ADMIN — SODIUM CHLORIDE, POTASSIUM CHLORIDE, SODIUM LACTATE AND CALCIUM CHLORIDE: 600; 310; 30; 20 INJECTION, SOLUTION INTRAVENOUS at 09:19

## 2024-04-11 RX ADMIN — OXYCODONE HYDROCHLORIDE 5 MG: 5 TABLET ORAL at 13:04

## 2024-04-11 RX ADMIN — PHENYLEPHRINE HYDROCHLORIDE 100 MCG: 10 INJECTION INTRAVENOUS at 07:56

## 2024-04-11 RX ADMIN — BUPIVACAINE HYDROCHLORIDE IN DEXTROSE 1.6 ML: 7.5 INJECTION, SOLUTION SUBARACHNOID at 07:31

## 2024-04-11 RX ADMIN — EPHEDRINE SULFATE 5 MG: 5 INJECTION INTRAVENOUS at 08:22

## 2024-04-11 RX ADMIN — PROPOFOL 50 MG: 10 INJECTION, EMULSION INTRAVENOUS at 07:47

## 2024-04-11 RX ADMIN — FENTANYL CITRATE 50 MCG: 50 INJECTION, SOLUTION INTRAMUSCULAR; INTRAVENOUS at 10:34

## 2024-04-11 RX ADMIN — PHENYLEPHRINE HYDROCHLORIDE 100 MCG: 10 INJECTION INTRAVENOUS at 08:05

## 2024-04-11 RX ADMIN — HYDROMORPHONE HYDROCHLORIDE 0.2 MG: 1 INJECTION, SOLUTION INTRAMUSCULAR; INTRAVENOUS; SUBCUTANEOUS at 09:20

## 2024-04-11 RX ADMIN — PROPOFOL 100 MCG/KG/MIN: 10 INJECTION, EMULSION INTRAVENOUS at 07:47

## 2024-04-11 RX ADMIN — ONDANSETRON 4 MG: 2 INJECTION INTRAMUSCULAR; INTRAVENOUS at 09:21

## 2024-04-11 ASSESSMENT — ACTIVITIES OF DAILY LIVING (ADL)
ADLS_ACUITY_SCORE: 27
ADLS_ACUITY_SCORE: 27
ADLS_ACUITY_SCORE: 28
ADLS_ACUITY_SCORE: 29
ADLS_ACUITY_SCORE: 29
ADLS_ACUITY_SCORE: 27
ADLS_ACUITY_SCORE: 28
ADLS_ACUITY_SCORE: 28
ADLS_ACUITY_SCORE: 29
ADLS_ACUITY_SCORE: 27
IADL_COMMENTS: WILL HAVE ASSIST WITH IADLS
ADLS_ACUITY_SCORE: 29

## 2024-04-11 ASSESSMENT — ENCOUNTER SYMPTOMS: SEIZURES: 0

## 2024-04-11 ASSESSMENT — LIFESTYLE VARIABLES: TOBACCO_USE: 0

## 2024-04-11 NOTE — BRIEF OP NOTE
Orthopaedic Surgery Brief Op-Note      Patient: Js GALARZA Hester  : 1955  Date of Service: 2024 9:51 AM    Pre-operative Diagnosis: Primary localized osteoarthritis of right hip [M16.11]  Post-operative Diagnosis: same    Procedure(s) Performed: Procedure(s):  ARTHROPLASTY, RIGHT  HIP, TOTAL FAST TRACK    Staff: Dr. Mendoza  Assistants:   Alvina Orr, RENAE Garza MD    Anesthesia: Spinal  EBL: 350 cc    Implants:   Implant Name Type Inv. Item Serial No.  Lot No. LRB No. Used Action   IMP SHELL SNR ACET R3 3H 52MM [id number] - [id number] Total Joint Component/Insert IMP SHELL SNR ACET R3 3H 52MM [id number]  [employer] [id number] Right 1 Implanted   IMP LINER SNR ACET R3 XLPE 0DEG [id number] - [id number] Total Joint Component/Insert IMP LINER SNR ACET R3 XLPE 0DEG [id number]  [employer] [id number] Right 1 Implanted   IMP SCR ACET SNN SPHERICAL HEAD 6.5X40MM [id number] - [id number] Metallic Hardware/[employer] IMP SCR ACET SNN SPHERICAL HEAD 6.5X40MM [id number]  [employer] [id number] Right 1 Implanted   IMP SCR ACET SNN SPHERICAL HEAD 6.5X25MM [id number] - [id number] Metallic Hardware/[employer] IMP SCR ACET SNN SPHERICAL HEAD 6.5X25MM [id number]  [employer] [id number] Right 1 Implanted   IMP STEM FEM HIP SNN SYNERGY POROUS SZ 14 HO [id number] - [id number] Total Joint Component/Insert IMP STEM FEM HIP SNN SYNERGY POROUS SZ 14 HO [id number]  [employer] [id number] Right 1 Implanted   IMP HEAD FEMORAL SNN BIPOLAR [place] 36MM +4 [id number] - [id number] Total Joint Component/Insert IMP HEAD FEMORAL SNN BIPOLAR [place] 36MM +4 [id number]  [employer] [id number] Right 1 Implanted     Drains: none  Intra-op Labs/Cxs/Specimens: None  Complications: No apparent complications during procedure  Findings: Please see dictated operative note for details    Disposition: Stable to PACU, then admit to Orthopaedics    POST OPERATIVE PLAN    Assessment/Plan: Js GALARZA  Kacie is a 68 year old male s/p Procedure(s):  ARTHROPLASTY, RIGHT  HIP, TOTAL FAST TRACK on 4/11/2024 with Dr. Mendoza.    Activity: Up with assist and assistive devices as needed until independent. Posterior hip precautions to operative side x 3 months:  1) No hip flexion beyond 90 degrees  2) No adduction beyond midline  3) No internal rotation beyond neutral   Weight bearing status: WBAT  Antibiotics: Cefazolin x 24 hours  Diet: Begin with clear fluids and progress diet as tolerated. Bowel regimen. Anti-emetics PRN.    DVT prophylaxis:  mg x 4 weeks beginning POD 1   Elevation: Elevate heels off of bed on pillows   Wound Care: Tegaderm and alginate x 2 weeks   Drains: none  Pain management: Orals PRN, IV for breakthrough only  X-rays: AP Pelvis and Lateral operative hip in PACU.   Physical Therapy: Mobilization, ROM, ADL's, Posterior hip precautions.    Occupational Therapy: ADL's  Consults: PT, OT, appreciate assistance in caring for this patient throughout the perioperative period    Future Appointments   Date Time Provider Department Center   4/11/2024  1:00 PM Elizabeth Reyes PT [facility] [place]   4/11/2024  2:00 PM Cindy Martin OT [facility] [place]   4/15/2024  9:30 AM Noel Lakhain PT [facility]    4/23/2024  3:00 PM [facility]   5/23/2024  9:20 AM Marvin Mendoza MD [facility]       Disposition: Pending progress with therapies, pain control on orals, and medical stability, anticipate discharge to home from PACU POD 0.    I assisted with positioning, prepping and draping, and closure.      Alvina Orr PA-C 4/11/2024 9:51 AM  Orthopedic Surgery

## 2024-04-11 NOTE — ANESTHESIA PROCEDURE NOTES
"Intrathecal injection Procedure Note    Pre-Procedure   Staff -        Anesthesiologist:  Sal Chaudhari MD       Performed By: anesthesiologist       Location: OR       Procedure Start/Stop Times: 4/11/2024 7:31 AM       Pre-Anesthestic Checklist: patient identified, IV checked, risks and benefits discussed, informed consent, monitors and equipment checked, pre-op evaluation, at physician/surgeon's request and post-op pain management  Timeout:       Correct Patient: Yes        Correct Procedure: Yes        Correct Site: Yes        Correct Position: Yes   Procedure Documentation  Procedure: intrathecal injection       Diagnosis: Knee OA       Patient Position: sitting       Skin prep: Chloraprep       Insertion Site: L2-3. (midline approach).       Needle Gauge: 27.        Needle Length (Inches): 3.5        Spinal Needle Type: QuinckeNo introducer used       # of attempts: 1 and  # of redirects:  0    Assessment/Narrative         CSF fluid: clear.       Opening pressure was cmH2O while  Sitting.      Medication(s) Administered   0.75% Hyperbaric Bupivacaine (Intrathecal) - Intrathecal   1.6 mL - 4/11/2024 7:31:00 AM  Medication Administration Time: 4/11/2024 7:31 AM      FOR [facility] [place] ONLY:   Pain Team Contact information: please page the Pain Team Via [employer]. Search \"Pain\". During daytime hours, please page the attending first. At night please page the resident first.      "

## 2024-04-11 NOTE — PROGRESS NOTES
Pt tolerated 1 L of lactated ringers. Pt ate sandwich and drank some fluid. Able to walk around unit without difficulty. Able to void.  Vital sign stable. Discussed with Dr. Matta and patient able to discharge at this time. No further concerns

## 2024-04-11 NOTE — PROGRESS NOTES
[facility]  OUTPATIENT OCCUPATIONAL THERAPY  EVALUATION  PLAN OF TREATMENT FOR OUTPATIENT REHABILITATION  (COMPLETE FOR INITIAL CLAIMS ONLY)  Patient's Last Name, First Name, M.I.  YOB: 1955  Js Hester                          Provider's Name  [facility] Medical Record No.  [id number]                             Onset Date:  04/11/24   Start of Care Date:  04/11/24   Type:     ___PT   _X_OT   ___SLP Medical Diagnosis:  s/p R KATIE                    OT Diagnosis:  Decreased IND with ADLs Visits from SOC:  1     See note for plan of treatment, functional goals and certification details    I CERTIFY THE NEED FOR THESE SERVICES FURNISHED UNDER        THIS PLAN OF TREATMENT AND WHILE UNDER MY CARE     (Physician co-signature of this document indicates review and certification of the therapy plan).                               04/11/24 1320   Appointment Info   Signing Clinician's Name / Credentials (OT) Cindy Martin MA OTR/L   Rehab Comments (OT) R KATIE, post prec   Quick Adds   Quick Adds Certification   Living Environment   People in Home spouse   Current Living Arrangements house   Number of Stairs, Within Home, Primary greater than 10 stairs   Transportation Anticipated family or friend will provide   Living Environment Comments Has standard toilet. Walk in shower.   Self-Care   Usual Activity Tolerance good   Current Activity Tolerance moderate   Equipment Currently Used at Home walker, rolling   Fall history within last six months no   Instrumental Activities of Daily Living (IADL)   IADL Comments will have assist with IADLs   General Information   Onset of Illness/Injury or Date of Surgery 04/11/24   Referring Physician Dr. Mendoza   Patient/Family Therapy Goal Statement (OT) Home today   Additional Occupational Profile Info/Pertinent History of Current Problem s/p R KATIE   Existing Precautions/Restrictions fall;no hip IR;no hip ADD  past midline;90 degree hip flexion;no pivoting or twisting   Right Lower Extremity (Weight-bearing Status) weight-bearing as tolerated (WBAT)   Cognitive Status Examination   Orientation Status orientation to person, place and time   Visual Perception   Visual Impairment/Limitations WNL   Sensory   Sensory Comments pt reported numbness has worn off   Pain Assessment   Patient Currently in Pain Yes, see Vital Sign flowsheet   Range of Motion Comprehensive   Comment, General Range of Motion B UE's WFL   Strength Comprehensive (MMT)   Comment, General Manual Muscle Testing (MMT) Assessment B UE's WFL   Coordination   Upper Extremity Coordination No deficits were identified   Bed Mobility   Comment (Bed Mobility) CGA   Sit-Stand Transfer   Sit-Stand [place] (Transfers) contact guard;set up;verbal cues   Assistive Device (Sit-Stand Transfers) walker, front-wheeled   Bathing Assessment/Intervention   [place] Level (Bathing) minimum assist (75% patient effort)   Lower Body Dressing Assessment/Training   [place] Level (Lower Body Dressing) maximum assist (25% patient effort)   Toileting   [place] Level (Toileting) supervision;verbal cues   Assistive Devices (Toileting) raised toilet seat   Clinical Impression   Criteria for Skilled Therapeutic Interventions Met (OT) Yes, treatment indicated   OT Diagnosis Decreased IND with ADLs   OT Problem List-Impairments impacting ADL problems related to;activity tolerance impaired;pain;post-surgical precautions   Assessment of Occupational Performance 1-3 Performance Deficits   Identified Performance Deficits dressing, bathing, toileting   Planned Therapy Interventions (OT) ADL retraining   Clinical Decision Making Complexity (OT) problem focused assessment/low complexity   Risk & Benefits of therapy have been explained evaluation/treatment results reviewed;care plan/treatment goals reviewed;patient;spouse/significant other   OT Total Evaluation Time   OT Eval, Low  Complexity Minutes (35887) 8   Therapy Certification   Start of Care Date 04/11/24   Certification date from 04/11/24   Certification date to 04/12/24   Medical Diagnosis s/p R KATIE   OT Goals   Therapy Frequency (OT) One time eval and treatment   OT Predicted Duration/Target Date for Goal Attainment 04/11/24   OT Goals Lower Body Dressing;Transfers   OT: Lower Body Dressing Supervision/stand-by assist;using adaptive equipment;within precautions;Goal Met   OT: Transfer Supervision/stand-by assist;with assistive device;within precautions;Goal Met   Self-Care/Home Management   Self-Care/Home Mgmt/ADL, Compensatory, Meal Prep Minutes (54557) 32   Symptoms Noted During/After Treatment (Meal Preparation/Planning Training) fatigue;increased pain   Treatment Detail/Skilled Intervention Pt resting in bed upon arrival, wife present. Pt and wife educated on post op precautions and implications for ADL, provided functional examples. Educated on bed mobility and pt completed with CGA. Pt inquired about sleeping, advised on back or L sidelying is ok as long as pillows in between legs to maintain precautions, pt verbalized understanding. Pt educated on LE dressing using AE, pt then completed with SBA and vc's. Educated on toilet transfer and hygiene within precautions, and advised to get RTS as lower-standard toilet, pt's wife reports can get from family member/friend. Faciliated education on shower task and LB bathing using LH sponge, pt has walk in shower with no concerns. Pt and wife educated on hip kit and will be obtaining from [employer], wife to help with tasks until kit arrives. Pt transferred to chair using FWW with SBA and vc's. Pt sitting up in chair, needing one cue to avoid leaning forward.   OT Discharge Planning   OT Plan dc from OT   OT Discharge Recommendation (DC Rec)   (defer to ortho)   OT Rationale for DC Rec pt has good support and assist at home, will have all necessary DME in place.   OT Brief overview of  current status SBA LE dressing using AE, SBA transfer to chair using FWW   Total Session Time   Timed Code Treatment Minutes 32   Total Session Time (sum of timed and untimed services) 40

## 2024-04-11 NOTE — OP NOTE
OPERATIVE REPORT    DATE OF SERVICE: 4/11/24    SURGEON: Marvin Mendoza MD.    ASSISTANT(S): Timothy Garza MD and Alvina Orr PA-C    PREOPERATIVE DIAGNOSIS:  Osteoarthritis    POSTOPERATIVE DIAGNOSIS:  Osteoarthritis    OPERATION PERFORMED:  right total hip arthroplasty    IMPLANTS:    Implant Name Type Inv. Item Serial No.  Lot No. LRB No. Used Action   IMP SHELL SNR ACET R3 3H 52MM [id number] - [id number] Total Joint Component/Insert IMP SHELL SNR ACET R3 3H 52MM [id number]  [employer] [id number] Right 1 Implanted   IMP LINER SNR ACET R3 XLPE 0DEG [id number] - [id number] Total Joint Component/Insert IMP LINER SNR ACET R3 XLPE 0DEG [id number]  [employer] [id number] Right 1 Implanted   IMP SCR ACET SNN SPHERICAL HEAD 6.5X40MM [id number] - [id number] Metallic Hardware/Zarephath IMP SCR ACET SNN SPHERICAL HEAD 6.5X40MM [id number]  [employer] [id number] Right 1 Implanted   IMP SCR ACET SNN SPHERICAL HEAD 6.5X25MM [id number] - [id number] Metallic Hardware/Zarephath IMP SCR ACET SNN SPHERICAL HEAD 6.5X25MM [id number]  [employer] [id number] Right 1 Implanted   IMP STEM FEM HIP SNN SYNERGY POROUS SZ 14 HO [id number] - [id number] Total Joint Component/Insert IMP STEM FEM HIP SNN SYNERGY POROUS SZ 14 HO [id number]  [employer] [id number] Right 1 Implanted   IMP HEAD FEMORAL SNN BIPOLAR [place] 36MM +4 [id number] - [id number] Total Joint Component/Insert IMP HEAD FEMORAL SNN BIPOLAR [place] 36MM +4 [id number]  [employer] [id number] Right 1 Implanted       ANESTHETIC: spinal    OPERATIVE FINDINGS:  End stage arthrosis of the hip    BLOOD LOSS: about 350 ml     COMPLICATIONS:  None apparent    OPERATIVE INDICATIONS:  The patient has a long history of debilitating pain secondary to ostearthritis of the hip.  Despite comprehensive non-operative management these symptoms continued to interfere with activities of daily living.  After discussion of further treatment options  including the risks and benefits that patient elected to proceed with a total hip.    DESCRIPTION OF THE PROCEDURE:  The patient was identified in the preoperative holding area.  The consent form including the risks and benefits were reviewed with the patient.  The operative limb was identified and marked.  The patient was brought back to the operating room and placed supine on the operating table.  An anesthetic was induced by the anesthesia team.   The patient was placed in the lateral decubitus position and prepped and draped in the normal standard fashion for a hip replacement.  A time-out was called.  Antibiotics were given.  We utilized an approximately 15 cm curvilinear incision, centered on the vastus ridge, and performed a standard posterior approach to the hip.  The tensor fascia was split.  A small portion of gluteus patsy was split in line with its fibers.  The sciatic nerve was palpated.  The east-west retractor was placed.  The posterior border of gluteus medius was exposed and retracted.  The tendon of piriformis and that of the obturators was released from their attachments.  A trapdoor posterior capsulotomy was performed.  The hip was dislocated.  The lesser trochanter was exposed.  A ruler was used to measure and electrocautery was used to suzanne our neck cut as preoperatively templated.  The head was measured with a caliper and found to be 49 mm.  This measurement was used to choose our first reamer.  The neck cut was re-measured. The femur was elevated.  A Hohmann was placed over the anterior rim of the acetabulum and the femur was subluxed anterior.  A split was made in the inferior capsule.  The transverse acetabular ligament was left intact and used a guide for the anterversion of the acetabular component.  Circumferential retractors were placed.  We began reaming and went up by two until sufficient contact was made with the acetabular rim.  We then went up by one millimeter for a one  "millimeter press-fit.  We were within one size of our preoperative plan.   A trail was placed.  It had an excellent press fit.  We then placed out final component in 40 degrees of inclination and approximately 20 degrees of anteversion, parallel to the transverse ligament.  The press fit was excellent.  Screws were placed for additional initial fixation.  A flat liner was then placed. It locked into place.  Attention was turned to the femur.  Retractors were placed to elevated the proximal femur and to protect the tendon of gluteus medius.  Remaining lateral neck was removed and the piriformis fossa was cleared of soft-tissue.  A box osteotome and canal finder were used to prepare for broaching.  A sharp broach was used to lateralize slightly.  We then reamed and broached up sequentially to a size 14.  It was rotationally stable and sat up 1-2 millimeters from the neck-cut.  Preoperatively the patient had templated to a high offset stem.  The high offset stem appropriately tensioned the abductors.  We trialed the following femoral heads: 0 and +4.  The +4 appropriately tensioned the abductors and clinically equalized the leg-lengths.  The stability exam was excellent.  The hip was stable and there was no impingement posteriorly with hyper-extension and maximal external rotation.  With full extension, the knee could be fixed to bring the foot nearly to the buttock.  With the hip in ninety degrees of flexion and neutral rotation there was greater than 60 degrees of internal rotation before subluxation.  There appropriate movement with a \"king\" test.  Happy with our stability exam, the final implant was placed in approximately twenty degrees of anteversion.  It sat within 1 mm of the broach.  We then trailed with a +4 head.  The stability exam was identical.  We then placed the final head on a clean, dry neck and impacted it into place. The hip was reduced after directly visualizing the entire acetabulum.  The wound " was then irrigated.  The posterior capsule was repaired to the anterior capsule and and short external rotators were sutured to their anatomic attachment on the greater trochanter with non-absorbable suture through bone tunnels.The fascia was closed with interrupted Vicryl, the dermis with interrupted Vicryl, and skin with running monocryl, Dermabond and steri-strips.  At the end of the procedure the sponge and needle counts were correct times two.  The patient tolerated the procedure well and returned to the PAR extubated and stable.    POSTOPERATIVE PLAN:  1. Weight bearing as tolerated  2. Standard posterior hip precautions  3. DVT prophylaxis with ECASA  4. 24 hours of prophylactic antibiotics  5. Follow-up:  Wound clinic in 2 weeks and with Reji in clinic in 6 weeks for x-rays and a rehabilitation check.

## 2024-04-11 NOTE — PROGRESS NOTES
[facility]  OUTPATIENT PHYSICAL THERAPY EVALUATION  PLAN OF TREATMENT FOR OUTPATIENT REHABILITATION  (COMPLETE FOR INITIAL CLAIMS ONLY)  Patient's Last Name, First Name, M.I.  YOB: 1955  Js Hester                        Provider's Name  [facility] Medical Record No.  [id number]                             Onset Date:  04/11/24   Start of Care Date:   4/11/24   Type:     _X_PT   ___OT   ___SLP Medical Diagnosis:                 PT Diagnosis:  impaired mobility, ambulation, and stairs Visits from SOC:  1     See note for plan of treatment, functional goals and certification details    I CERTIFY THE NEED FOR THESE SERVICES FURNISHED UNDER        THIS PLAN OF TREATMENT AND WHILE UNDER MY CARE     (Physician co-signature of this document indicates review and certification of the therapy plan).              04/11/24 1200   Appointment Info   Signing Clinician's Name / Credentials (PT) FRANKLIN Ramos   Student Supervision On-site supervision provided;Direct supervision provided;Line of sight supervision provided;Direct Patient Contact Provided;Therapy services provided with the co-signing licensed therapist guiding and directing the services, and providing the skilled judgement and assessment throughout the session       Present no   Language English   Living Environment   People in Home spouse  (wife)   Current Living Arrangements house   Home Accessibility stairs to enter home  (2 without railings, a post to grab)   Number of Stairs, Main Entrance 2   Stair Railings, Main Entrance railing on right side (ascending)   Number of Stairs, Within Home, Primary greater than 10 stairs  (able to live on one level when home, slight ledge to hang on the way down.)   Stair Railings, Within Home, Primary railings safe and in good condition  (one railing on the R ascending in the house)   Transportation Anticipated family or  friend will provide  (wife named Ely)   Living Environment Comments walk in shower   Self-Care   Usual Activity Tolerance good   Current Activity Tolerance good   Regular Exercise No   Equipment Currently Used at Home walker, standard  (was going to borrow from friend, but does not FWW)   Fall history within last six months no   Activity/Exercise/Self-Care Comment Walk in shower.   General Information   Onset of Illness/Injury or Date of Surgery 04/11/24   Referring Physician Marvin Mendoza MD   Patient/Family Therapy Goals Statement (PT) to go home safely   Pertinent History of Current Problem (include personal factors and/or comorbidities that impact the POC) Pt is a 67 y/o male s/p total hip arthroscopy on 4/11/24 with Dr. Marvin Mendoza   Existing Precautions/Restrictions fall;no hip IR;no hip ADD past midline;90 degree hip flexion;weight bearing   Weight-Bearing Status - LUE full weight-bearing   Weight-Bearing Status - RUE full weight-bearing   Weight-Bearing Status - LLE full weight-bearing   Weight-Bearing Status - RLE weight-bearing as tolerated   General Observations Supine greeted in recliner upon entering, agreeable to PT. Moving w/ S of wife in room.   Cognition   Affect/Mental Status (Cognition) WNL   Pain Assessment   Patient Currently in Pain Yes, see Vital Sign flowsheet   Integumentary/Edema   Integumentary/Edema Comments Surgical incision consistent with procedure.   Posture    Posture Forward head position;Protracted shoulders   Range of Motion (ROM)   Range of Motion ROM deficits secondary to surgical procedure   Strength (Manual Muscle Testing)   Strength (Manual Muscle Testing) strength is WFL   Bed Mobility   Bed Mobility no deficits identified   Comment, (Bed Mobility) IND   Transfers   Transfers no deficits identified   Comment, (Transfers) able to transfer w/o assistance   Gait/Stairs (Locomotion)   [place] Level (Gait) modified independence   Assistive Device (Gait) walker,  front-wheeled   Distance in Feet (Gait) 350   Balance   Balance no deficits were identified   Sensory Examination   Sensory Perception WFL   Coordination   Coordination no deficits were identified   Muscle Tone   Muscle Tone no deficits were identified   Clinical Impression   Criteria for Skilled Therapeutic Intervention Yes, treatment indicated   PT Diagnosis (PT) impaired mobility, ambulation, and stairs   Influenced by the following impairments pain and post-op precautions   Functional limitations due to impairments ambulation, stairs, transfers   Clinical Presentation (PT Evaluation Complexity) stable   Clinical Presentation Rationale per clinical judgement   Clinical Decision Making (Complexity) low complexity   Planned Therapy Interventions (PT) gait training;home exercise program;patient/family education;ROM (range of motion);stair training;strengthening;transfer training   Risk & Benefits of therapy have been explained evaluation/treatment results reviewed;care plan/treatment goals reviewed;risks/benefits reviewed;current/potential barriers reviewed;participants voiced agreement with care plan;participants included;patient;spouse/significant other  (wife in room)   Clinical Impression Comments Pt IND in all bed mobility. Pt needs S w/ ambulation w/ FWW. Able to perform stairs safely w/ use of R railing per home setup. No concerns at this time.   PT Total Evaluation Time   PT Eval, Low Complexity Minutes (37763) 5   Physical Therapy Goals   PT Frequency One time eval and treatment only   PT Predicted Duration/Target Date for Goal Attainment 04/11/24   PT Goals Gait;Stairs;Transfers;Bed Mobility   PT: Bed Mobility Independent;Goal Met;Within precautions   PT: Transfers Independent;Sit to/from stand;Bed to/from chair;Assistive device;Within precautions;Goal Met   PT: Gait Greater than 200 feet;Within precautions;Rolling walker;Modified independent;Goal Met   PT: Stairs 3 stairs;Modified independent;Within  precautions;Goal Met   Interventions   Interventions Quick Adds Gait Training;Therapeutic Activity;Therapeutic Procedure   Therapeutic Procedure/Exercise   Ther. Procedure: strength, endurance, ROM, flexibillity Minutes (79661) 8   Symptoms Noted During/After Treatment none   Treatment Detail/Skilled Intervention Pt completed the following exercises for 10 reps each: ankle pumps, quad sets, hamstring set, heel slides, glute sqeezes, SLR. No increase in pain noted during the exercises.   Therapeutic Activity   Therapeutic Activities: dynamic activities to improve functional performance Minutes (79315) 9   Symptoms Noted During/After Treatment Increased pain;Fatigue   Treatment Detail/Skilled Intervention Pt educated on the posterior hip precautions. Pt able to restate precautions w/ help from his wife in the room. Pt able to demonstrate stand pivot transfer from recliner to bed w/o AD and S. Pt then able to perform all bed mobility IND within precautions. No assistance needed from wife for bed mobility but able to support if needed. Pt able to supine>seated EOB>stand to FWW w/ S. See gait training. Pt returned to room w/ questions about FWW order and difference between standard walker. Pt educated on options at home to buy wheel attachments or issuing a FWW at the hospital. Pt decided to have FWW issued. Pt left in recliner w/o further questions and needs met. FWW order in place. No concerns DC home.   Gait Training   Gait Training Minutes (21148) 14   Symptoms Noted During/After Treatment (Gait Training) none   Treatment Detail/Skilled Intervention Pt amb 350ft w/ FWW w/ S. Pt demonstrated sufficient gait speed, stride/step length. Noteably decreased in step width during amb. Pt cued to aim R LE toward button on top of FWW to help widen TED. Pt approached therapy stairs w/ FWW. Pt shown correct gait pattern to ascend stairs w/ B UE on R railing per home setup. Pt initially started to ascend stairs one on each step  but cued to place both feet on the step before continuing to the next. Pt able to ascend/descend 3 stairs w/ S safely with no concerns at this time.   PT Discharge Planning   PT Plan DC home today with OP PT scheduled for Monday 4/15.   PT Discharge Recommendation (DC Rec)   (defer to ortho)   PT Rationale for DC Rec Pt IND in all bed mobility. Pt needs S w/ ambulation w/ FWW. Able to perform stairs safely w/ use of R railing per home setup. No concerns at this time.   PT Brief overview of current status IND in bed mob, S w/ amb and stairs.   PT Equipment Needed at Discharge walker, rolling   Total Session Time   Timed Code Treatment Minutes 31   Total Session Time (sum of timed and untimed services) 36

## 2024-04-11 NOTE — ANESTHESIA CARE TRANSFER NOTE
Patient: Js Hester    Procedure: Procedure(s):  ARTHROPLASTY, RIGHT  HIP, TOTAL FAST TRACK       Diagnosis: Primary localized osteoarthritis of right hip [M16.11]  Diagnosis Additional Information: No value filed.    Anesthesia Type:   Spinal     Note:    Oropharynx: oropharynx clear of all foreign objects and spontaneously breathing  Level of Consciousness: drowsy  Oxygen Supplementation: room air    Independent Airway: airway patency satisfactory and stable  Dentition: dentition unchanged  Vital Signs Stable: post-procedure vital signs reviewed and stable  Report to RN Given: handoff report given  Patient transferred to: PACU    Handoff Report: Identifed the Patient, Identified the Reponsible Provider, Reviewed the pertinent medical history, Discussed the surgical course, Reviewed Intra-OP anesthesia mangement and issues during anesthesia, Set expectations for post-procedure period and Allowed opportunity for questions and acknowledgement of understanding      Vitals:  Vitals Value Taken Time   /76    Temp 36.6    Pulse 70 04/11/24 0955   Resp 14 04/11/24 0955   SpO2 98 % 04/11/24 0955   Vitals shown include unfiled device data.    Electronically Signed By: JACQUI Simmons CRNA  April 11, 2024  9:56 AM

## 2024-04-11 NOTE — ANESTHESIA POSTPROCEDURE EVALUATION
Patient: Js Hester    Procedure: Procedure(s):  ARTHROPLASTY, RIGHT  HIP, TOTAL FAST TRACK       Anesthesia Type:  Spinal    Note:  Disposition: Outpatient   Postop Pain Control: Uneventful            Sign Out: Well controlled pain   PONV: No   Neuro/Psych: Uneventful            Sign Out: Acceptable/Baseline neuro status   Airway/Respiratory: Uneventful            Sign Out: Acceptable/Baseline resp. status   CV/Hemodynamics: Uneventful            Sign Out: Acceptable CV status; No obvious hypovolemia; No obvious fluid overload   Other NRE:    DID A NON-ROUTINE EVENT OCCUR?            Last vitals:  Vitals Value Taken Time   /83 04/11/24 1045   Temp 36.6  C (97.8  F) 04/11/24 0948   Pulse 73 04/11/24 1050   Resp 14 04/11/24 1050   SpO2 96 % 04/11/24 1050   Vitals shown include unfiled device data.    Electronically Signed By: Hesham Tamez MD  April 11, 2024  11:23 AM

## 2024-04-11 NOTE — ANESTHESIA PREPROCEDURE EVALUATION
Pre-Operative H & P     CC:  Preoperative exam to assess for increased cardiopulmonary risk while undergoing surgery and anesthesia.    Date of Encounter: 3/27/2024  Primary Care Physician:  Enma Fu     Reason for visit:   Encounter Diagnosis   Name Primary?     Primary osteoarthritis of right hip Yes       HPI  Js Hester is a 68 year old male who presents for pre-operative H & P in preparation for  Procedure Information       Case: [id number] Date/Time: 04/11/24 0730    Procedure: ARTHROPLASTY, RIGHT  HIP, TOTAL FAST TRACK (Right: Hip)    Anesthesia type: Choice    Diagnosis: Primary localized osteoarthritis of right hip [M16.11]    Pre-op diagnosis: Primary localized osteoarthritis of right hip [M16.11]    Location: [facility]    Providers: Marvin Mendoza MD            Patient is being evaluated for comorbid conditions of ascending aorta dilation, CAD, dyslipidemia    Mr. Hester has known advanced right hip OA. He has tried SI joint injections and underwent spine workup. He was seen by Dr. Mendoza and is now scheduled for the above procedure.     History is obtained from the patient and chart review    Hx of abnormal bleeding or anti-platelet use: ASA 81 mg      Past Medical History  Past Medical History:   Diagnosis Date     Arthritis      Ascending aorta dilation (H24)     per 11/3/15 stress echo, moderate dilated asc aorta of 4.4 cm. Mildly dilated aortic root 4.2 cm.     Coronary artery disease     per 10/16 CT calcium score--total score of 304, predominantly in the distribution of the left coronary     Hyperlipidaemia        Past Surgical History  Past Surgical History:   Procedure Laterality Date     COLONOSCOPY       TKA         Prior to Admission Medications  No current outpatient medications on file.       Allergies  No Known Allergies    Social History  Social History     Socioeconomic History     Marital status:      Spouse name: Not on file     Number of children: Not  on file     Years of education: Not on file     Highest education level: Not on file   Occupational History     Not on file   Tobacco Use     Smoking status: Never     Smokeless tobacco: Never   Substance and Sexual Activity     Alcohol use: Yes     Comment: 10-12 drinks/week     Drug use: Yes     Types: Marijuana     Comment: CBD edibles     Sexual activity: Not on file   Other Topics Concern      Service Not Asked     Blood Transfusions Not Asked     Caffeine Concern Yes     Comment: 3 cups coffee per day     Occupational Exposure Not Asked     Hobby Hazards Not Asked     Sleep Concern No     Stress Concern No     Weight Concern No     Special Diet No     Back Care Not Asked     Exercise Yes     Comment: walking, x-ctry skiing     Bike Helmet Not Asked     Seat Belt Not Asked     Self-Exams Not Asked     Parent/sibling w/ CABG, MI or angioplasty before 65F 55M? Not Asked   Social History Narrative     Not on file     Social Determinants of Health     Financial Resource Strain: Not on file   Food Insecurity: Not on file   Transportation Needs: Not on file   Physical Activity: Not on file   Stress: Not on file   Social Connections: Not on file   Interpersonal Safety: Not on file   Housing Stability: Not on file       Family History  Family History   Problem Relation Age of Onset     Diabetes Father      Hyperlipidemia Sister      Diabetes Sister      Hyperlipidemia Sister      Cerebrovascular Disease Sister      Coronary Artery Disease Paternal Half-Sister      Anesthesia Reaction No family hx of      Deep Vein Thrombosis (DVT) No family hx of        Review of Systems  The complete review of systems is negative other than noted in the HPI or here.   Anesthesia Evaluation   Pt has had prior anesthetic.     No history of anesthetic complications       ROS/MED HX  ENT/Pulmonary:  - neg pulmonary ROS  (-) tobacco use   Neurologic:  - neg neurologic ROS  (-) no seizures and no CVA   Cardiovascular:     (+)  Dyslipidemia - -  CAD -  - -   Taking blood thinners                              Previous cardiac testing   Echo: Date: 2017 Results:   CONCLUSION:    1. Limited echocardiogram.    2. Normal LV size and systolic function. Mild concentric LVH.     3. Normal RV size and function.     4. Aortic root measures 4.5 cm and ascending aorta measures 4.3 cm.    5. Prior study report 1/24/17, aortic root measures 4.5 cm and     ascending aorta measures 4.4 cm.     Stress Test:  Date: 2015 Results:  Interpretation Summary  The patient exercised 9:00.  The patient exhibited no chest pain during exercise.  Normal resting wall motion and no stress-induced wall motion abnormality.  This was a normal stress echocardiogram.  Mildly dilated aortic root 4.2 cm. Moderalety dilated ascending aorta 4.4  cm.The aortic valve is trileaflet.    ECG Reviewed:  Date: 2015 Results:  SR  Cath:  Date: Results:      METS/Exercise Tolerance: >4 METS Comment: Riding stationary bike- 45-60 minutes, can walk a mile   Hematologic:  - neg hematologic  ROS  (-) history of blood clots and history of blood transfusion   Musculoskeletal: Comment: Thigh pain and weakness   (+)  arthritis,             GI/Hepatic:  - neg GI/hepatic ROS  (-) GERD   Renal/Genitourinary:  - neg Renal ROS     Endo:  - neg endo ROS  (-) chronic steroid usage   Psychiatric/Substance Use:       Infectious Disease:  - neg infectious disease ROS     Malignancy:  - neg malignancy ROS     Other:            Virtual visit -  No vitals were obtained    Physical Exam  Constitutional: Awake, alert, cooperative, no apparent distress, and appears stated age.  HENT: Normocephalic  Respiratory: non labored breathing   Neurologic: Awake, alert, oriented to name, place and time.   Neuropsychiatric: Calm, cooperative. Normal affect.      Prior Labs/Diagnostic Studies   All labs and imaging personally reviewed     EKG/ stress test - if available please see in ROS above   No results found.       No  data to display                  The patient's records and results personally reviewed by this provider.     Outside records reviewed from: Care Everywhere      Assessment    Js Hester is a 68 year old male seen as a PAC referral for risk assessment and optimization for anesthesia.    Plan/Recommendations  Pt will be optimized for the proposed procedure.  See below for details on the assessment, risk, and preoperative recommendations    NEUROLOGY  - No history of TIA, CVA or seizure  -Post Op delirium risk factors:  No risk identified    ENT  - No current airway concerns.  Will need to be reassessed day of surgery.  Mallampati: Unable to assess  TM: Unable to assess    CARDIAC  - No history of Hypertension and Afib  -h/o ascending aortic dilation (4.4 cm 1/2017). At that time, recommendation made to reapeat echo in 6 months. Ascending aorta stable at 4.3 cm.   -calcium screening elevated at 304 in 2015. Stress test without ischemia. Aggressive lifestyle modifications recommended at that time  -denies cardiac symptoms - reports he is riding a stationary bike 45-60 minutes at a time and can walk a mile without exertional symptoms  -patient reports he stopped taking ASA and statin years ago. I encouraged him to consider restarting for protection as he had an elevated coronary calcium score in the past.    - METS (Metabolic Equivalents)  Patient performs 4 or more METS exercise without symptoms             Total Score: 0      RCRI-Low risk: Class 2 0.9% complication rate             Total Score: 1    RCRI: CAD        PULMONARY  JESSICA Low Risk             Total Score: 2    JESSICA: Over 50 ys old    JESSICA: Male      - Denies asthma or inhaler use  - Tobacco History    History   Smoking Status     Never   Smokeless Tobacco     Never       GI  PONV Medium Risk  Total Score: 2           1 AN PONV: Patient is not a current smoker    1 AN PONV: Intended Post Op Opioids        /RENAL  - Baseline Creatinine WNL 2021, will  "update prior to the procedure    ENDOCRINE    - BMI: Estimated body mass index is 26.7 kg/m  as calculated from the following:    Height as of this encounter: 1.778 m (5' 10\").    Weight as of this encounter: 84.4 kg (186 lb 1.1 oz).  Overweight (BMI 25.0-29.9)  - No history of Diabetes Mellitus    HEME  VTE Low Risk 0.5%             Total Score: 3    VTE: Greater than 59 yrs old    VTE: Male      -will update CBC prior to surgery    MSK  -OA with the above procedure planned     Different anesthesia methods/types have been discussed with the patient, but they are aware that the final plan will be decided by the assigned anesthesia provider on the date of service.    The patient is optimized for their procedure. AVS with information on surgery time/arrival time, meds and NPO status given by nursing staff. No further diagnostic testing indicated.    Please refer to the physical examination documented by the anesthesiologist in the anesthesia record on the day of surgery.    Video-Visit Details    Type of service:  Video Visit    Provider received verbal consent for a Video Visit from the patient? Yes     Originating Location (pt. Location): Home    Distant Location (provider location):  Off-site  Mode of Communication:  Video Conference via [employer]  On the day of service:     Prep time: 12 minutes  Visit time: 13 minutes  Documentation time: 7 minutes  ------------------------------------------  Total time: 22 minutes      Mitali Ace PA-C  Preoperative Assessment Center  [facility]  [facility]  Phone: [phone]  Fax: [phone]    Physical Exam    Airway        Mallampati: II   TM distance: > 3 FB   Neck ROM: full   Mouth opening: > 3 cm    Respiratory Devices and Support         Dental           Cardiovascular   cardiovascular exam normal          Pulmonary   pulmonary exam normal            Anesthesia Plan    ASA Status:  2       Anesthesia Type: Spinal.          "     Consents    Anesthesia Plan(s) and associated risks, benefits, and realistic alternatives discussed. Questions answered and patient/representative(s) expressed understanding.     - Discussed: Risks, Benefits and Alternatives for BOTH SEDATION and the PROCEDURE were discussed     - Discussed with:  Patient            Postoperative Care    Pain management: IV analgesics, Multi-modal analgesia.   PONV prophylaxis: Ondansetron (or other 5HT-3)     Comments:

## 2024-04-11 NOTE — PLAN OF CARE
Occupational Therapy Discharge Summary    Reason for therapy discharge:    All goals and outcomes met, no further needs identified.    Progress towards therapy goal(s). See goals on Care Plan in [facility] electronic health record for goal details.  Goals met    Therapy recommendation(s):    No further therapy is recommended.

## 2024-04-11 NOTE — PROVIDER NOTIFICATION
Patient walked to restroom without issue. Returned to room, sat in chair. Several minutes later, patient stated he was feeling dizzy. As patient was assessed and hooked up to monitors, patient went unresponsive. Extremities cool to touch, patient breathing. Concern for vasovagal vs. stroke. Staff assist called, escalated to have anesthesia in room. Anesthesia at bedside, patient positioned laying flat in recliner. Patient did not fall or hit head, no concern for hip dislocation. See flowsheets. Patient became increasingly responsive with positioning. Fluid bolus started. BG stable. MARINA Weiner at bedside. Orders to continue fluid bolus, monitor with frequent BPs for minimum 30 additional minutes, ok to discharge if all looks stable. Regine handing off to [facility], to assess prior to discharge. Dr. Mendoza paged to notify of post-op course.     Patient stated he was feeling much better, able to drink cranberry juice and eat a sandwich, no nausea. Sent home with instructions to move slowly through transitions lying/sitting/standing. Sent with urinal to use in immediate post-op period to minimize need to rush to the bathroom, and instructions for what to do if he felt a vasovagal reaction starting to occur. Wife Ely at bedside through events. Both Alvin and Ely expressed acceptance with instructions and plan, all questions answered, felt confident with plan to discharge.

## 2024-04-11 NOTE — PLAN OF CARE
Physical Therapy Discharge Summary    Reason for therapy discharge: Discharge home with OP PT     Progress towards therapy goal(s). See goals on Care Plan in [facility] electronic health record for goal details.  Goals met    Therapy recommendation(s):    Continued therapy is recommended.  Rationale/Recommendations:  Pt recommended for OP PT. Pt is scheduled for PT appointment on Monday 4/15/24.

## 2024-04-12 NOTE — PROGRESS NOTES
PHYSICAL THERAPY EVALUATION  Type of Visit: Evaluation    See electronic medical record for Abuse and Falls Screening details.    Subjective   Date of onset: 04/11/24      Prior diagnostic imaging/testing results: X-ray     Prior therapy history for the same diagnosis, illness or injury: Yes      Living Environment  Social support: With a significant other or spouse   Type of home: House   Stairs to enter the home: Yes 5 Is there a railing: Yes   Ramp:     Stairs inside the home: Yes 15 Is there a railing: Yes   Help at home: None  Equipment owned:       Employment: No    Hobbies/Interests: Gardening,  biking,  hiking, golfing    Patient goals for therapy:      Js Hester is a 68 year old male with a right hip condition. Mechanism of injury: s/p right KATIE on 4/11/24, WBAT, posterior hip precautions for 3 months: no hip FL beyond 90, no adduction beyond midline, no IR beyond neutral. Where: (home, work, MVA, community, recreation/sport, unknown, other): NA. Location of symptoms: lateral, anterior, groin, anterior thigh. Pain level on number scale: 3-9/10. Quality of pain: bruising. Associated symptoms: weakness, stiffness. Pain frequency (constant/intermittent): constant. Symptoms are exacerbated by: walking, standing, stairs, squatting, sit<>stand. Symptoms are relieved by: pain medication, icing. Progression of symptoms since onset (same/better/worse): better. Special tests (x-ray, MRI, CT scan, EMG, bone scan): x-ray. Previous treatment: None. Improvement with previous treatment: NA. General health as reported by patient is good. Pertinent medical history includes:  see Epic. Medical allergies includes: see Epic. Surgical history includes: see Epic. Current medications include: see Epic. Occupation: Retired. Patient is (working in normal job without restrictions/working in normal job with restrictions/working in an alternate job/not working due to present treatment problem): NA. Primary job tasks: NA.  Barriers at home/work: None reported by patient. Red flags: None reported by patient.     Objective   Gait:  WBAT, SEC in left hand, short step and stride length, flexed posture    Bilateral hip AROM WFL    Weak proximal hip musculature    Palpation tenderness:  Incisions, right greater trochanter  Assessment & Plan   CLINICAL IMPRESSIONS  Medical Diagnosis: Status post total replacement of right hip, Primary osteoarthritis of right hip    Treatment Diagnosis: Status post total replacement of right hip, Primary osteoarthritis of right hip   Impression/Assessment: Patient is a 68 year old male with right hip complaints.  The following significant findings have been identified: Pain, Decreased ROM/flexibility, Decreased strength, Impaired balance, Decreased proprioception, Impaired sensation, Impaired gait, Impaired muscle performance, Decreased activity tolerance, and Impaired posture. These impairments interfere with their ability to perform self care tasks, recreational activities, household chores, driving , household mobility, and community mobility as compared to previous level of function.     Clinical Decision Making (Complexity):  Clinical Presentation: Stable/Uncomplicated  Clinical Presentation Rationale: based on medical and personal factors listed in PT evaluation  Clinical Decision Making (Complexity): Low complexity    PLAN OF CARE  Treatment Interventions:  Interventions: Gait Training, Manual Therapy, Neuromuscular Re-education, Therapeutic Activity, Therapeutic Exercise, Self-Care/Home Management    Long Term Goals     PT Goal 1  Goal Description: Patient will be able to walk 30 minutes.  Rationale: to maximize safety and independence within the community  Target Date: 07/08/24      Frequency of Treatment: 2x per week  Duration of Treatment: for 4 weeks tapering down to 1x per month for 8 weeks    Recommended Referrals to Other Professionals:  None  Education Assessment:   Learner/Method: Patient;No  Barriers to Learning    Risks and benefits of evaluation/treatment have been explained.   Patient/Family/caregiver agrees with Plan of Care.     Evaluation Time:     PT Eval, Low Complexity Minutes (90318): 15  Signing Clinician: EDER Cruz [facility]                                                                                   OUTPATIENT PHYSICAL THERAPY      PLAN OF TREATMENT FOR OUTPATIENT REHABILITATION   Patient's Last Name, First Name, Js Diaz YOB: 1955   Provider's Name   [facility]   Medical Record No.  [id number]     Onset Date: 04/11/24  Start of Care Date: 04/15/24     Medical Diagnosis:  Status post total replacement of right hip, Primary osteoarthritis of right hip      PT Treatment Diagnosis:  Status post total replacement of right hip, Primary osteoarthritis of right hip Plan of Treatment  Frequency/Duration: 2x per week/ for 4 weeks tapering down to 1x per month for 8 weeks    Certification date from 04/15/24 to 07/08/24         See note for plan of treatment details and functional goals     Noel Lakhani PT                         I CERTIFY THE NEED FOR THESE SERVICES FURNISHED UNDER        THIS PLAN OF TREATMENT AND WHILE UNDER MY CARE     (Physician attestation of this document indicates review and certification of the therapy plan).              Referring Provider:  Marvin Mendoza    Initial Assessment  See Epic Evaluation- Start of Care Date: 04/15/24

## 2024-04-14 ENCOUNTER — MYC MEDICAL ADVICE (OUTPATIENT)
Dept: ORTHOPEDICS | Facility: CLINIC | Age: 69
End: 2024-04-14
Payer: COMMERCIAL

## 2024-04-14 DIAGNOSIS — Z96.641 STATUS POST TOTAL REPLACEMENT OF RIGHT HIP: Primary | ICD-10-CM

## 2024-04-14 ASSESSMENT — ACTIVITIES OF DAILY LIVING (ADL)
PUTTING ON SOCKS AND SHOES: EXTREME DIFFICULTY
SPORTS_COUNT: 8
GOING_UP_1_FLIGHT_OF_STAIRS: MODERATE DIFFICULTY
GOING UP 1 FLIGHT OF STAIRS: MODERATE DIFFICULTY
HOW_WOULD_YOU_RATE_YOUR_CURRENT_LEVEL_OF_FUNCTION_DURING_YOUR_USUAL_ACTIVITIES_OF_DAILY_LIVING_FROM_0_TO_100_WITH_100_BEING_YOUR_LEVEL_OF_FUNCTION_PRIOR_TO_YOUR_HIP_PROBLEM_AND_0_BEING_THE_INABILITY_TO_PERFORM_ANY_OF_YOUR_USUAL_DAILY_ACTIVITIES?: 50
HOW_WOULD_YOU_RATE_YOUR_CURRENT_LEVEL_OF_FUNCTION?: ABNORMAL
ROLLING_OVER_IN_BED: EXTREME DIFFICULTY
LANDING: UNABLE TO DO
WALKING_FOR_APPROXIMATELY_10_MINUTES: MODERATE DIFFICULTY
WALKING_INITIALLY: SLIGHT DIFFICULTY
ROLLING OVER IN BED: EXTREME DIFFICULTY
STANDING_FOR_15_MINUTES: UNABLE TO DO
SWINGING_OBJECTS_LIKE_A_GOLF_CLUB: MODERATE DIFFICULTY
SPORTS_HIGHEST_POTENTIAL_SCORE: 32
ADL_COUNT: 17
SPORTS_TOTAL_ITEM_SCORE: INCOMPLETE
HOW_WOULD_YOU_RATE_YOUR_CURRENT_LEVEL_OF_FUNCTION_DURING_YOUR_USUAL_ACTIVITIES_OF_DAILY_LIVING_FROM_0_TO_100_WITH_100_BEING_YOUR_LEVEL_OF_FUNCTION_PRIOR_TO_YOUR_HIP_PROBLEM_AND_0_BEING_THE_INABILITY_TO_PERFORM_ANY_OF_YOUR_USUAL_DAILY_ACTIVITIES?: 50
ADL_TOTAL_ITEM_SCORE: 0
SITTING FOR 15 MINUTES: SLIGHT DIFFICULTY
ADL_HIGHEST_POTENTIAL_SCORE: 68
GOING DOWN 1 FLIGHT OF STAIRS: MODERATE DIFFICULTY
JUMPING: UNABLE TO DO
LOW_IMPACT_ACTIVITIES_LIKE_FAST_WALKING: UNABLE TO DO
ABILITY_TO_PARTICIPATE_IN_YOUR_DESIRED_SPORT_AS_LONG_AS_YOU_WOULD_LIKE: EXTREME DIFFICULTY
SITTING_FOR_15_MINUTES: SLIGHT DIFFICULTY
STARTING_AND_STOPPING_QUICKLY: UNABLE TO DO
HOS_ADL_ITEM_SCORE_TOTAL: 10
ADL_SCORE(%): 0
PUTTING_ON_SOCKS_AND_SHOES: EXTREME DIFFICULTY
PLEASE_INDICATE_YOR_PRIMARY_REASON_FOR_REFERRAL_TO_THERAPY:: HIP
ABILITY_TO_PERFORM_ACTIVITY_WITH_YOUR_NORMAL_TECHNIQUE: UNABLE TO DO
TWISTING/PIVOTING_ON_INVOLVED_LEG: UNABLE TO DO
GETTING INTO AND OUT OF AN AVERAGE CAR: UNABLE TO DO
SPORTS_SCORE(%): INCOMPLETE
HOS_ADL_HIGHEST_POTENTIAL_SCORE: 32
WALKING_INITIALLY: SLIGHT DIFFICULTY
GOING_DOWN_1_FLIGHT_OF_STAIRS: MODERATE DIFFICULTY
TWISTING/PIVOTING ON INVOLVED LEG: UNABLE TO DO
CUTTING/LATERAL_MOVEMENTS: UNABLE TO DO
GETTING_INTO_AND_OUT_OF_AN_AVERAGE_CAR: UNABLE TO DO
WALKING_APPROXIMATELY_10_MINUTES: MODERATE DIFFICULTY
STANDING FOR 15 MINUTES: UNABLE TO DO

## 2024-04-15 ENCOUNTER — THERAPY VISIT (OUTPATIENT)
Dept: PHYSICAL THERAPY | Facility: CLINIC | Age: 69
End: 2024-04-15
Attending: ORTHOPAEDIC SURGERY
Payer: COMMERCIAL

## 2024-04-15 DIAGNOSIS — M16.11 PRIMARY OSTEOARTHRITIS OF RIGHT HIP: ICD-10-CM

## 2024-04-15 DIAGNOSIS — Z96.641 STATUS POST TOTAL REPLACEMENT OF RIGHT HIP: ICD-10-CM

## 2024-04-15 PROCEDURE — 97530 THERAPEUTIC ACTIVITIES: CPT | Mod: GP | Performed by: PHYSICAL THERAPIST

## 2024-04-15 PROCEDURE — 97110 THERAPEUTIC EXERCISES: CPT | Mod: 59 | Performed by: PHYSICAL THERAPIST

## 2024-04-15 PROCEDURE — 97161 PT EVAL LOW COMPLEX 20 MIN: CPT | Mod: GP | Performed by: PHYSICAL THERAPIST

## 2024-04-15 RX ORDER — OXYCODONE HYDROCHLORIDE 5 MG/1
5 TABLET ORAL EVERY 4 HOURS PRN
Qty: 30 TABLET | Refills: 0 | Status: SHIPPED | OUTPATIENT
Start: 2024-04-15 | End: 2024-04-21

## 2024-04-16 ASSESSMENT — HOOS JR
SITTING: MILD
WALKING ON UNEVEN SURFACE: MODERATE
LYING IN BED (TURNING OVER, MAINTAINING HIP POSITION): MODERATE
BENDING TO THE FLOOR TO PICK UP OBJECT: MODERATE
RISING FROM SITTING: SEVERE
GOING UP OR DOWN STAIRS: MODERATE
HOOS JR TOTAL INTERVAL SCORE: 52.97

## 2024-04-17 ENCOUNTER — THERAPY VISIT (OUTPATIENT)
Dept: PHYSICAL THERAPY | Facility: CLINIC | Age: 69
End: 2024-04-17
Payer: COMMERCIAL

## 2024-04-17 DIAGNOSIS — Z96.641 STATUS POST TOTAL REPLACEMENT OF RIGHT HIP: ICD-10-CM

## 2024-04-17 DIAGNOSIS — M16.11 PRIMARY OSTEOARTHRITIS OF RIGHT HIP: Primary | ICD-10-CM

## 2024-04-17 PROCEDURE — 97530 THERAPEUTIC ACTIVITIES: CPT | Mod: GP | Performed by: PHYSICAL THERAPIST

## 2024-04-17 PROCEDURE — 97110 THERAPEUTIC EXERCISES: CPT | Mod: GP | Performed by: PHYSICAL THERAPIST

## 2024-04-21 ENCOUNTER — APPOINTMENT (OUTPATIENT)
Dept: CT IMAGING | Facility: CLINIC | Age: 69
DRG: 176 | End: 2024-04-21
Attending: EMERGENCY MEDICINE
Payer: COMMERCIAL

## 2024-04-21 ENCOUNTER — HOSPITAL ENCOUNTER (EMERGENCY)
Facility: CLINIC | Age: 69
Discharge: HOME | DRG: 176 | End: 2024-04-21
Attending: EMERGENCY MEDICINE | Admitting: EMERGENCY MEDICINE
Payer: COMMERCIAL

## 2024-04-21 VITALS
OXYGEN SATURATION: 98 % | TEMPERATURE: 98 F | BODY MASS INDEX: 27.2 KG/M2 | WEIGHT: 190 LBS | RESPIRATION RATE: 20 BRPM | DIASTOLIC BLOOD PRESSURE: 87 MMHG | SYSTOLIC BLOOD PRESSURE: 145 MMHG | HEIGHT: 70 IN | HEART RATE: 91 BPM

## 2024-04-21 DIAGNOSIS — F41.9 ANXIETY: Primary | ICD-10-CM

## 2024-04-21 DIAGNOSIS — I26.94 MULTIPLE SUBSEGMENTAL PULMONARY EMBOLI WITHOUT ACUTE COR PULMONALE (H): ICD-10-CM

## 2024-04-21 LAB
ALBUMIN SERPL BCG-MCNC: 3.8 G/DL (ref 3.5–5.2)
ALP SERPL-CCNC: 135 U/L (ref 40–150)
ALT SERPL W P-5'-P-CCNC: 78 U/L (ref 0–70)
ANION GAP SERPL CALCULATED.3IONS-SCNC: 10 MMOL/L (ref 7–15)
AST SERPL W P-5'-P-CCNC: 32 U/L (ref 0–45)
ATRIAL RATE - MUSE: 89 BPM
BASOPHILS # BLD AUTO: 0 10E3/UL (ref 0–0.2)
BASOPHILS NFR BLD AUTO: 1 %
BILIRUB SERPL-MCNC: 0.9 MG/DL
BUN SERPL-MCNC: 14.6 MG/DL (ref 8–23)
CALCIUM SERPL-MCNC: 8.7 MG/DL (ref 8.8–10.2)
CHLORIDE SERPL-SCNC: 105 MMOL/L (ref 98–107)
CREAT SERPL-MCNC: 0.79 MG/DL (ref 0.67–1.17)
DEPRECATED HCO3 PLAS-SCNC: 22 MMOL/L (ref 22–29)
DIASTOLIC BLOOD PRESSURE - MUSE: NORMAL MMHG
EGFRCR SERPLBLD CKD-EPI 2021: >90 ML/MIN/1.73M2
EOSINOPHIL # BLD AUTO: 0 10E3/UL (ref 0–0.7)
EOSINOPHIL NFR BLD AUTO: 0 %
ERYTHROCYTE [DISTWIDTH] IN BLOOD BY AUTOMATED COUNT: 12.9 % (ref 10–15)
FLUAV RNA SPEC QL NAA+PROBE: NEGATIVE
FLUBV RNA RESP QL NAA+PROBE: NEGATIVE
GLUCOSE SERPL-MCNC: 120 MG/DL (ref 70–99)
HCT VFR BLD AUTO: 37.8 % (ref 40–53)
HGB BLD-MCNC: 12.7 G/DL (ref 13.3–17.7)
IMM GRANULOCYTES # BLD: 0.1 10E3/UL
IMM GRANULOCYTES NFR BLD: 1 %
INTERPRETATION ECG - MUSE: NORMAL
LYMPHOCYTES # BLD AUTO: 0.6 10E3/UL (ref 0.8–5.3)
LYMPHOCYTES NFR BLD AUTO: 8 %
MCH RBC QN AUTO: 30.4 PG (ref 26.5–33)
MCHC RBC AUTO-ENTMCNC: 33.6 G/DL (ref 31.5–36.5)
MCV RBC AUTO: 90 FL (ref 78–100)
MONOCYTES # BLD AUTO: 0.5 10E3/UL (ref 0–1.3)
MONOCYTES NFR BLD AUTO: 8 %
NEUTROPHILS # BLD AUTO: 5.7 10E3/UL (ref 1.6–8.3)
NEUTROPHILS NFR BLD AUTO: 82 %
NRBC # BLD AUTO: 0 10E3/UL
NRBC BLD AUTO-RTO: 0 /100
NT-PROBNP SERPL-MCNC: 184 PG/ML (ref 0–900)
P AXIS - MUSE: 27 DEGREES
PLATELET # BLD AUTO: 272 10E3/UL (ref 150–450)
POTASSIUM SERPL-SCNC: 3.9 MMOL/L (ref 3.4–5.3)
PR INTERVAL - MUSE: 156 MS
PROT SERPL-MCNC: 6.7 G/DL (ref 6.4–8.3)
QRS DURATION - MUSE: 92 MS
QT - MUSE: 380 MS
QTC - MUSE: 462 MS
R AXIS - MUSE: -31 DEGREES
RBC # BLD AUTO: 4.18 10E6/UL (ref 4.4–5.9)
RSV RNA SPEC NAA+PROBE: NEGATIVE
SARS-COV-2 RNA RESP QL NAA+PROBE: NEGATIVE
SODIUM SERPL-SCNC: 137 MMOL/L (ref 135–145)
SYSTOLIC BLOOD PRESSURE - MUSE: NORMAL MMHG
T AXIS - MUSE: -8 DEGREES
TROPONIN T SERPL HS-MCNC: 10 NG/L
TROPONIN T SERPL HS-MCNC: 9 NG/L
VENTRICULAR RATE- MUSE: 89 BPM
WBC # BLD AUTO: 6.9 10E3/UL (ref 4–11)

## 2024-04-21 PROCEDURE — 71260 CT THORAX DX C+: CPT

## 2024-04-21 PROCEDURE — 87637 SARSCOV2&INF A&B&RSV AMP PRB: CPT | Performed by: EMERGENCY MEDICINE

## 2024-04-21 PROCEDURE — 85025 COMPLETE CBC W/AUTO DIFF WBC: CPT | Performed by: EMERGENCY MEDICINE

## 2024-04-21 PROCEDURE — 93005 ELECTROCARDIOGRAM TRACING: CPT | Mod: 59 | Performed by: EMERGENCY MEDICINE

## 2024-04-21 PROCEDURE — 99285 EMERGENCY DEPT VISIT HI MDM: CPT | Mod: 25 | Performed by: EMERGENCY MEDICINE

## 2024-04-21 PROCEDURE — 84484 ASSAY OF TROPONIN QUANT: CPT | Mod: 91 | Performed by: EMERGENCY MEDICINE

## 2024-04-21 PROCEDURE — 250N000009 HC RX 250: Performed by: EMERGENCY MEDICINE

## 2024-04-21 PROCEDURE — 99291 CRITICAL CARE FIRST HOUR: CPT | Mod: 25 | Performed by: EMERGENCY MEDICINE

## 2024-04-21 PROCEDURE — 250N000013 HC RX MED GY IP 250 OP 250 PS 637: Performed by: EMERGENCY MEDICINE

## 2024-04-21 PROCEDURE — 120N000002 HC R&B MED SURG/OB UMMC

## 2024-04-21 PROCEDURE — 82040 ASSAY OF SERUM ALBUMIN: CPT | Performed by: EMERGENCY MEDICINE

## 2024-04-21 PROCEDURE — 83880 ASSAY OF NATRIURETIC PEPTIDE: CPT | Performed by: EMERGENCY MEDICINE

## 2024-04-21 PROCEDURE — 250N000011 HC RX IP 250 OP 636: Performed by: EMERGENCY MEDICINE

## 2024-04-21 PROCEDURE — 93010 ELECTROCARDIOGRAM REPORT: CPT | Mod: 59 | Performed by: EMERGENCY MEDICINE

## 2024-04-21 PROCEDURE — 93308 TTE F-UP OR LMTD: CPT | Mod: 26 | Performed by: EMERGENCY MEDICINE

## 2024-04-21 PROCEDURE — 93308 TTE F-UP OR LMTD: CPT | Performed by: EMERGENCY MEDICINE

## 2024-04-21 PROCEDURE — 36415 COLL VENOUS BLD VENIPUNCTURE: CPT | Performed by: EMERGENCY MEDICINE

## 2024-04-21 PROCEDURE — 99204 OFFICE O/P NEW MOD 45 MIN: CPT | Performed by: PEDIATRICS

## 2024-04-21 RX ORDER — HYDROXYZINE HYDROCHLORIDE 25 MG/1
25 TABLET, FILM COATED ORAL ONCE
Status: COMPLETED | OUTPATIENT
Start: 2024-04-21 | End: 2024-04-21

## 2024-04-21 RX ORDER — HYDROXYZINE HYDROCHLORIDE 25 MG/1
50 TABLET, FILM COATED ORAL 3 TIMES DAILY PRN
Qty: 30 TABLET | Refills: 0 | Status: SHIPPED | OUTPATIENT
Start: 2024-04-21 | End: 2024-04-23

## 2024-04-21 RX ORDER — IOPAMIDOL 755 MG/ML
100 INJECTION, SOLUTION INTRAVASCULAR ONCE
Status: COMPLETED | OUTPATIENT
Start: 2024-04-21 | End: 2024-04-21

## 2024-04-21 RX ADMIN — IOPAMIDOL 90 ML: 755 INJECTION, SOLUTION INTRAVENOUS at 10:06

## 2024-04-21 RX ADMIN — RIVAROXABAN 15 MG: 15 TABLET, FILM COATED ORAL at 12:21

## 2024-04-21 RX ADMIN — HYDROXYZINE HYDROCHLORIDE 25 MG: 25 TABLET, FILM COATED ORAL at 12:22

## 2024-04-21 RX ADMIN — SODIUM CHLORIDE 53 ML: 9 INJECTION, SOLUTION INTRAVENOUS at 10:06

## 2024-04-21 ASSESSMENT — ACTIVITIES OF DAILY LIVING (ADL)
ADLS_ACUITY_SCORE: 35

## 2024-04-21 ASSESSMENT — COLUMBIA-SUICIDE SEVERITY RATING SCALE - C-SSRS
1. IN THE PAST MONTH, HAVE YOU WISHED YOU WERE DEAD OR WISHED YOU COULD GO TO SLEEP AND NOT WAKE UP?: NO
2. HAVE YOU ACTUALLY HAD ANY THOUGHTS OF KILLING YOURSELF IN THE PAST MONTH?: NO
6. HAVE YOU EVER DONE ANYTHING, STARTED TO DO ANYTHING, OR PREPARED TO DO ANYTHING TO END YOUR LIFE?: NO

## 2024-04-21 NOTE — DISCHARGE INSTRUCTIONS
You came to the hospital for symptoms of shortness of breath and feeling like you had low oxygen, we did many tests and found you had small blood clots around you lung, this was almost certainly related to you recent hip surgery (blood clots are a well known and common complication of this type of surgery). We started you on a blood thinner called rivaroxiban which helps prevent clots in the future. At this point we believe it is safe for you continue your recovery outside the hospital. Remember to follow up with your primary doctor and with Dr monroe within the next week as we discussed. If you notice symptoms such as chest pain, shortness of breath, chest pressure, or any other sudden onset or severe symptom, come to the ER right away like we discussed.       FOR YOUR BLOOD CLOTS  -- take rivaroxiban twice daily for 21 days, then take once daily, you will need to be on this blood thinner for at least 6 months, perhaps longer. Talk to your primary doctor about this when you see them in clinic  -- continue your usual therapies and staying active (as you have been doing) as a part of your recovery for surgery, these clots and the new blood thinner should not slow down your recovery  -- watch for symptoms of bleeding as we discussed    Thank you for allowing us to care for you child while you were sick in the hospital. Best of luck with the recovery    Juan Pablo Aguilera MD            Pulmonary Embolism: Care Instructions  Overview     Pulmonary embolism is the sudden blockage of an artery in the lung. Blood clots in the deep veins of the leg or pelvis (deep vein thrombosis, or DVT) are the most common cause. These blood clots can travel to the lungs.  Pulmonary embolism can be very serious. Because you have had one pulmonary embolism, you are at greater risk for having another one. But you can take steps to prevent another pulmonary embolism.  You will probably take a prescription blood-thinning medicine to prevent  blood clots. A blood thinner can stop a blood clot from growing larger and prevent new clots from forming.  Follow-up care is a key part of your treatment and safety. Be sure to make and go to all appointments, and call your doctor if you are having problems. It's also a good idea to know your test results and keep a list of the medicines you take.  How can you care for yourself at home?  Take your medicines exactly as prescribed. Call your doctor if you think you are having a problem with your medicine. You will get more details on the specific medicines your doctor prescribes.  If you are taking a blood thinner, be sure you get instructions about how to take your medicine safely. Blood thinners can cause serious bleeding problems.  Try to walk several times a day. Walking helps keep blood moving in your legs. Before doing other types of exercise, ask your doctor what type and level of exercise is safe for you.  Take steps to help prevent blood clots in your legs. For example:  Exercise your lower leg muscles if you sit for long periods of time. Pump your feet up and down by pulling your toes up toward your knees then pointing them down. Repeat.  After an illness or surgery, try to get up and out of bed often. If you can't get out of bed, flex your feet every hour to keep the blood moving through your legs.  Take plenty of breaks when you travel. On long car trips, stop the car and walk around every hour or so. On the bus, plane, or train, get out of your seat and walk up and down the aisle every hour, if you can.  Wear compression stockings if your doctor recommends them.  Check with your doctor about whether you should use hormonal forms of birth control or hormone therapy. These may increase your risk of blood clots.  Have a healthy lifestyle. This includes being active, staying at a healthy weight, and not smoking.  Get vaccinated against COVID-19, the flu, and pneumonia.  When should you call for help?   Call  "911 anytime you think you may need emergency care. For example, call if:    You have shortness of breath.     You have chest pain.     You passed out (lost consciousness).     You cough up blood.   Call your doctor now or seek immediate medical care if:    You have new or worsening pain or swelling in your leg.   Watch closely for changes in your health, and be sure to contact your doctor if:    You do not get better as expected.   Where can you learn more?  Go to https://www.[employer].net/patiented  Enter A877 in the search box to learn more about \"Pulmonary Embolism: Care Instructions.\"  Current as of: August 6, 2023               Content Version: 14.0    6257-1704 [employer].   Care instructions adapted under license by your healthcare professional. If you have questions about a medical condition or this instruction, always ask your healthcare professional. [employer] disclaims any warranty or liability for your use of this information.      "

## 2024-04-21 NOTE — CONSULTS
"[facility]  Consult Note - Hospitalist Service, GOLD TEAM 21  Date of Admission:  4/21/2024  Consult Requested by: Dr Swift  Reason for Consult: new dgx PE    ITEMS FOR FOLLOW UP    #Subsegmental PE  -- new dgx, PCP follow-up for cotnd DOAC (minimum 3 months)  -- consider hypercoagulable work up    #Aortic Dilation  -- stable Ao dilation from 2015 to 2024, monitor per ACC/AHA guidelines (every 6 months)    #R hip arthritis s/p KATIE  -- has follow-up with Ortho, routine, no concerns    Assessment & Plan   Js Hester is a 69 yo with PMH of asrthritis, s/p recent right KATIE, known 4.4cm Ao dilation, HLD      #Subsegmental Pe's  :: provoked s/p recent right KATIE (apr 2024) while compliant on BID 81 mg ASA  :: no personal or FHX of VTE; no hypercoag work up needed at this time  :: recommend 3 mo DOAC and re-consider OAC with PCP at that time  :: Gave education, anticipatory guidance re: PE and use of DOAC and return precautions (including, but not limited to) bleeding, chest pain SOB, or failure to improve        #Aortic Dilation, 4.5 cm: monitor, non-op tx at this juncture  :: monitor per guidelines, is stable per CT chest April 2024    #R hip arthritis s/p KATIE  :: c/w PT, pain meds, etc; ok to use NSAID as he bridges, will be short duration of NSAID  :: follow-up per previous plamn    #Anxiety  :: use atarax prn, can d/w PCP starting buspar or other anxiolytic if contd having anxiety  :: encourage use of MBSR, \"combat breathing\" for acute anxiety         Clinically Significant Risk Factors Present on Admission                # Drug Induced Platelet Defect: home medication list includes an antiplatelet medication        # Overweight: Estimated body mass index is 27.26 kg/m  as calculated from the following:    Height as of this encounter: 1.778 m (5' 10\").    Weight as of this encounter: 86.2 kg (190 lb).              Juan Pablo Aguilera MD  Hospitalist Service, GOLD TEAM " 21  Securely message with [employer] (more info)  Text page via [employer] Paging/Directory   See signed in provider for up to date coverage information  ______________________________________________________________________    Chief Complaint   Shortness of breatha    History is obtained from the patient  Patients spouse    History of Present Illness   Pt is 69 yo with PMH of asrthritis, s/p recent right KATIE, known 4.4cm Ao dilation, HLD  Presented with SOB and concern for ACS vs PE vs pna vs CHF vs aoritic dx  Had thorough ED workup,got PO Xarelto with ease, given cotnd SOB plan to admit for OBS    Currentyl says feeling better, no chest pain or pressure no SOB no NV no FCS  Was having also maybe ?panic attacks per him an would be laying down / trying to get to sleep and sit up with feeling of low oxygen and SOB   Usign shared decision making d/w pt and pts wife options of home dispo vs OBS admit, favored home dispo, has close follow-up   Notable pt was taking ASA BID as idrected, wife says has been doing wwell with mobility post op; this provoked PE does not mandate hypercoag work at this time, but could be considered as outpt or if has subseq. probs      Past Medical History    Past Medical History:   Diagnosis Date    Arthritis     Ascending aorta dilation (H24)     per 11/3/15 stress echo, moderate dilated asc aorta of 4.4 cm. Mildly dilated aortic root 4.2 cm.    Coronary artery disease     per 10/16 CT calcium score--total score of 304, predominantly in the distribution of the left coronary    Hyperlipidaemia        Past Surgical History   Past Surgical History:   Procedure Laterality Date    ARTHROPLASTY HIP Right 4/11/2024    Procedure: ARTHROPLASTY, RIGHT  HIP, TOTAL;  Surgeon: Marvin Mendoza MD;  Location: [facility] OR    COLONOSCOPY      TKA         Medications   I have reviewed this patient's current medications  (Not in a hospital admission)        Review of Systems    The 5 point Review of Systems is negative  other than noted in the HPI or here.       Social History   I have reviewed this patient's social history and updated it with pertinent information if needed.  Social History     Tobacco Use    Smoking status: Never    Smokeless tobacco: Never   Substance Use Topics    Alcohol use: Yes     Comment: 10-12 drinks/week    Drug use: Yes     Types: Marijuana     Comment: CBD edibles         Family History   I have reviewed this patient's family history and updated it with pertinent information if needed.  Family History   Problem Relation Age of Onset    Diabetes Father     Hyperlipidemia Sister     Diabetes Sister     Hyperlipidemia Sister     Cerebrovascular Disease Sister     Coronary Artery Disease Paternal Half-Sister     Anesthesia Reaction No family hx of     Deep Vein Thrombosis (DVT) No family hx of          Allergies   No Known Allergies     Physical Exam   Vital Signs: Temp: 97.7  F (36.5  C) Temp src: Oral BP: 139/87 Pulse: 99   Resp: 11 SpO2: 97 % O2 Device: None (Room air)    Weight: 190 lbs 0 oz    EXAM  General: well appearing man sitting up in bed, NAD  Head: NC, AT  Eye: symm gaze, anicteric sclerae  ENT: patent nares wo drainage/epistaxis, MMM  Pulm: CTAB, comfortable WOB on RA  CV: normal rate, regular rhythm  GI: soft, NTND  Neuro: awake, alert, hearing speech and phonation, intact grossly                  Medical Decision Making       75 MINUTES SPENT BY ME on the date of service doing chart review, history, exam, documentation & further activities per the note.      Data     I have personally reviewed the following data over the past 24 hrs:    6.9  \   12.7 (L)   / 272     137 105 14.6 /  120 (H)   3.9 22 0.79 \     ALT: 78 (H) AST: 32 AP: 135 TBILI: 0.9   ALB: 3.8 TOT PROTEIN: 6.7 LIPASE: N/A     Trop: 9 BNP: 184       Imaging results reviewed over the past 24 hrs:   Recent Results (from the past 24 hour(s))   POC US ECHO LIMITED    Impression    Limited Bedside Cardiac Ultrasound, performed and  interpreted by me.   Indication: Shortness of Breath.  Parasternal long axis, parasternal short axis, apical 4 chamber, and subcostal views were acquired.   Image quality was satisfactory.    Findings:    Global left ventricular function appears intact.  Chambers do not appear dilated.  There is no evidence of free fluid within the pericardium.  No septal bowing.    IMPRESSION: Grossly normal left ventricular function and chamber size.  No pericardial effusion.        CT Aortic Survey w Contrast    Narrative    EXAM: CT AORTIC SURVEY W CONTRAST  LOCATION: [facility]  DATE: 4/21/2024    INDICATION: hx of ascending aortic dilation.  hip surgery 10 days ago.  dyspnea.  concern for PE, also evaluate aorta.  COMPARISON: None   TECHNIQUE: CT angiogram chest abdomen pelvis during arterial phase of injection of IV contrast. 2D and 3D MIP reconstructions were performed by the CT technologist. Dose reduction techniques were used.   CONTRAST: 90 mL Isovue 370    FINDINGS:   CT ANGIOGRAM CHEST, ABDOMEN, AND PELVIS: Acute-appearing pulmonary emboli in segmental and subsegmental arterial branches in the right lower lobe, lobar branches in the right upper lobe, and segmental branches in the left upper and left lower lobes. No   saddle emboli. Mildly dilated ascending aorta measures 4.5 cm in AP diameter. No dissection. No evidence of right heart strain. RV: LV ratio = 0.9.     LUNGS AND PLEURA: Calcified granuloma left upper lobe. Scattered pulmonary bullae. Mild dependent atelectasis bilaterally. Benign intrapulmonary lymph nodes in subpleural positions in the right lung.     MEDIASTINUM/AXILLAE: Tiny bilateral thyroid nodules, requiring no follow-up.     CORONARY ARTERY CALCIFICATION: Mild calcified atherosclerosis left anterior descending coronary artery.     HEPATOBILIARY: Normal.    PANCREAS: Normal.    SPLEEN: Normal.    ADRENAL GLANDS: Normal.    KIDNEYS/BLADDER: Bilateral  kidney cysts, requiring no follow-up.     BOWEL: Colonic diverticulosis. No diverticulitis. Normal appendix.     LYMPH NODES: Normal.    PELVIC ORGANS: Normal.    MUSCULOSKELETAL: Mild degenerative change bilateral glenohumeral joints. Right total hip arthroplasty. Mild degenerative change in the spine.       Impression    IMPRESSION:  Small volume acute-appearing bilateral pulmonary emboli. No evidence of pulmonary infarction or right heart strain.    Discussed by telephone with  Dr. Swift at 11:05 AM on 04/21/2024.       Recent Labs   Lab 04/21/24  0802   WBC 6.9   HGB 12.7*   MCV 90         POTASSIUM 3.9   CHLORIDE 105   CO2 22   BUN 14.6   CR 0.79   ANIONGAP 10   ESME 8.7*   *   ALBUMIN 3.8   PROTTOTAL 6.7   BILITOTAL 0.9   ALKPHOS 135   ALT 78*   AST 32

## 2024-04-21 NOTE — ED PROVIDER NOTES
ED Provider Note  [facility]      History     Chief Complaint   Patient presents with    Shortness of Breath     HPI  Js Hester is a 68 year old male with past medical history of ascending aortic dilation, hyperlipidemia, CAD presents to the emergency department for chief complaint of dyspnea that began last night.  Patient had a right hip arthroplasty 10 days ago for OA.  Patient since then he has been taking 2 baby aspirin daily.  He rode on his bike the other day.  Last night he felt very dyspneic, and states that he only had 1 hour of sleep.  He states that every time he fell asleep or lay flat back he felt dyspneic.  They thought he was having a panic attack.  He denies severe pain, his last oxygen ibuprofen last night.  He denies any chest pain, back pain (has chronic unchanged), fevers, nausea, vomiting, diaphoresis, abdominal pain.  He felt a little chilled, however this was because he had ice on him.    He last had a stress test 10 years ago.  Last evaluation of his ascending aortic dilation was back in 2017, it was 4.4 cm.      He has a little bit of right lower extremity edema after the surgery.  Denies any calf pain.  He was never had a blood clot.  Has not had a heart attack.  Denies any asthma or tobacco use.            Past Medical History  Past Medical History:   Diagnosis Date    Arthritis     Ascending aorta dilation (H24)     per 11/3/15 stress echo, moderate dilated asc aorta of 4.4 cm. Mildly dilated aortic root 4.2 cm.    Coronary artery disease     per 10/16 CT calcium score--total score of 304, predominantly in the distribution of the left coronary    Hyperlipidaemia      Past Surgical History:   Procedure Laterality Date    ARTHROPLASTY HIP Right 4/11/2024    Procedure: ARTHROPLASTY, RIGHT  HIP, TOTAL;  Surgeon: Marvin Mendoza MD;  Location: [facility] OR    COLONOSCOPY      TKA       acetaminophen (TYLENOL) 325 MG tablet  hydrOXYzine HCl (ATARAX) 25 MG  "tablet  ibuprofen (ADVIL/MOTRIN) 200 MG capsule  melatonin 3 MG tablet  oxyCODONE (ROXICODONE) 5 MG tablet  polyethylene glycol (MIRALAX) 17 g packet  Rivaroxaban ANTICOAGULANT 15 & 20 MG TBPK Starter Therapy Pack      No Known Allergies  Family History  Family History   Problem Relation Age of Onset    Diabetes Father     Hyperlipidemia Sister     Diabetes Sister     Hyperlipidemia Sister     Cerebrovascular Disease Sister     Coronary Artery Disease Paternal Half-Sister     Anesthesia Reaction No family hx of     Deep Vein Thrombosis (DVT) No family hx of      Social History   Social History     Tobacco Use    Smoking status: Never    Smokeless tobacco: Never   Substance Use Topics    Alcohol use: Yes     Comment: 10-12 drinks/week    Drug use: Yes     Types: Marijuana     Comment: CBD edibles         A medically appropriate review of systems was performed with pertinent positives and negatives noted in the HPI, and all other systems negative.    Physical Exam   BP: (!) 164/104  Pulse: 84  Temp: 97.7  F (36.5  C)  Resp: 16  Height: 177.8 cm (5' 10\")  Weight: 86.2 kg (190 lb)  SpO2: 97 %  Physical Exam  General: no acute distress.    HENT: Normocephalic and atraumatic. Trachea midline. Normal voice.  Eyes: EOMI, conjunctivae normal.    Cardiovascular:  Normal rate and regular rhythm.   No murmur heard.  Radial pulses 2+ bilaterally.  Pulmonary:  No respiratory distress. Normal breath sounds bilaterally.  Abdominal: no distension.  Abdomen is soft. There is no mass. There is no abdominal tenderness.  Musculoskeletal:    Moving all extremities spontaneously.  Right hip appropriately tender.  Incision appears well-healing without infection.  Trace right calf pitting edema.  No calf tenderness.  Negative Homans' sign.  Skin: Warm, dry, and well perfused. Good turgor.  Neurological:  No focal deficit present.    Psychiatric:   The patient is awake, alert.  Appropriate mood and affect.    ED Course, Procedures, & Data    "   Procedures  Results for orders placed during the hospital encounter of 04/21/24    POC US ECHO LIMITED    Impression  Limited Bedside Cardiac Ultrasound, performed and interpreted by me.  Indication: Shortness of Breath.  Parasternal long axis, parasternal short axis, apical 4 chamber, and subcostal views were acquired.  Image quality was satisfactory.    Findings:  Global left ventricular function appears intact.  Chambers do not appear dilated.  There is no evidence of free fluid within the pericardium.  No septal bowing.    IMPRESSION: Grossly normal left ventricular function and chamber size.  No pericardial effusion.            EKG Interpretation:      Interpreted by Fariba Swift MD  Time reviewed: 745  Symptoms at time of EKG: Dyspnea  Rhythm: normal sinus   Rate: normal  Axis: Left  Ectopy: none  Conduction: normal  ST Segments/ T Waves: No ST-T wave changes  Q Waves: none  Comparison to prior: Unchanged    Clinical Impression: normal EKG          Results for orders placed or performed during the hospital encounter of 04/21/24   POC US ECHO LIMITED     Status: None    Impression    Limited Bedside Cardiac Ultrasound, performed and interpreted by me.   Indication: Shortness of Breath.  Parasternal long axis, parasternal short axis, apical 4 chamber, and subcostal views were acquired.   Image quality was satisfactory.    Findings:    Global left ventricular function appears intact.  Chambers do not appear dilated.  There is no evidence of free fluid within the pericardium.  No septal bowing.    IMPRESSION: Grossly normal left ventricular function and chamber size.  No pericardial effusion.        CT Aortic Survey w Contrast     Status: None    Narrative    EXAM: CT AORTIC SURVEY W CONTRAST  LOCATION: [facility]  DATE: 4/21/2024    INDICATION: hx of ascending aortic dilation.  hip surgery 10 days ago.  dyspnea.  concern for PE, also evaluate aorta.  COMPARISON:  None   TECHNIQUE: CT angiogram chest abdomen pelvis during arterial phase of injection of IV contrast. 2D and 3D MIP reconstructions were performed by the CT technologist. Dose reduction techniques were used.   CONTRAST: 90 mL Isovue 370    FINDINGS:   CT ANGIOGRAM CHEST, ABDOMEN, AND PELVIS: Acute-appearing pulmonary emboli in segmental and subsegmental arterial branches in the right lower lobe, lobar branches in the right upper lobe, and segmental branches in the left upper and left lower lobes. No   saddle emboli. Mildly dilated ascending aorta measures 4.5 cm in AP diameter. No dissection. No evidence of right heart strain. RV: LV ratio = 0.9.     LUNGS AND PLEURA: Calcified granuloma left upper lobe. Scattered pulmonary bullae. Mild dependent atelectasis bilaterally. Benign intrapulmonary lymph nodes in subpleural positions in the right lung.     MEDIASTINUM/AXILLAE: Tiny bilateral thyroid nodules, requiring no follow-up.     CORONARY ARTERY CALCIFICATION: Mild calcified atherosclerosis left anterior descending coronary artery.     HEPATOBILIARY: Normal.    PANCREAS: Normal.    SPLEEN: Normal.    ADRENAL GLANDS: Normal.    KIDNEYS/BLADDER: Bilateral kidney cysts, requiring no follow-up.     BOWEL: Colonic diverticulosis. No diverticulitis. Normal appendix.     LYMPH NODES: Normal.    PELVIC ORGANS: Normal.    MUSCULOSKELETAL: Mild degenerative change bilateral glenohumeral joints. Right total hip arthroplasty. Mild degenerative change in the spine.       Impression    IMPRESSION:  Small volume acute-appearing bilateral pulmonary emboli. No evidence of pulmonary infarction or right heart strain.    Discussed by telephone with  Dr. Swift at 11:05 AM on 04/21/2024.     Comprehensive metabolic panel     Status: Abnormal   Result Value Ref Range    Sodium 137 135 - 145 mmol/L    Potassium 3.9 3.4 - 5.3 mmol/L    Carbon Dioxide (CO2) 22 22 - 29 mmol/L    Anion Gap 10 7 - 15 mmol/L    Urea Nitrogen 14.6 8.0 - 23.0 mg/dL     Creatinine 0.79 0.67 - 1.17 mg/dL    GFR Estimate >90 >60 mL/min/1.73m2    Calcium 8.7 (L) 8.8 - 10.2 mg/dL    Chloride 105 98 - 107 mmol/L    Glucose 120 (H) 70 - 99 mg/dL    Alkaline Phosphatase 135 40 - 150 U/L    AST 32 0 - 45 U/L    ALT 78 (H) 0 - 70 U/L    Protein Total 6.7 6.4 - 8.3 g/dL    Albumin 3.8 3.5 - 5.2 g/dL    Bilirubin Total 0.9 <=1.2 mg/dL   Troponin T, High Sensitivity     Status: Normal   Result Value Ref Range    Troponin T, High Sensitivity 10 <=22 ng/L   Symptomatic Influenza A/B, RSV, & SARS-CoV2 PCR (COVID-19) Nasopharyngeal     Status: Normal    Specimen: Nasopharyngeal; Swab   Result Value Ref Range    Influenza A PCR Negative Negative    Influenza B PCR Negative Negative    RSV PCR Negative Negative    SARS CoV2 PCR Negative Negative    Narrative    Testing was performed using the Xpert Xpress CoV2/Flu/RSV Assay on the Cepheid GeneXpert Instrument. This test should be ordered for the detection of SARS-CoV-2, influenza, and RSV viruses in individuals who meet clinical and/or epidemiological criteria. Test performance is unknown in asymptomatic patients. This test is for in vitro diagnostic use under the FDA EUA for laboratories certified under CLIA to perform high or moderate complexity testing. This test has not been FDA cleared or approved. A negative result does not rule out the presence of PCR inhibitors in the specimen or target RNA in concentration below the limit of detection for the assay. If only one viral target is positive but coinfection with multiple targets is suspected, the sample should be re-tested with another FDA cleared, approved, or authorized test, if coinfection would change clinical management. This test was validated by the [facility]. These laboratories are certified under the Clinical Laboratory Improvement Amendments of 1988 (CLIA-88) as qualified to perform high complexity laboratory testing.   Nt probnp inpatient (BNP)     Status:  Normal   Result Value Ref Range    N terminal Pro BNP Inpatient 184 0 - 900 pg/mL   CBC with platelets and differential     Status: Abnormal   Result Value Ref Range    WBC Count 6.9 4.0 - 11.0 10e3/uL    RBC Count 4.18 (L) 4.40 - 5.90 10e6/uL    Hemoglobin 12.7 (L) 13.3 - 17.7 g/dL    Hematocrit 37.8 (L) 40.0 - 53.0 %    MCV 90 78 - 100 fL    MCH 30.4 26.5 - 33.0 pg    MCHC 33.6 31.5 - 36.5 g/dL    RDW 12.9 10.0 - 15.0 %    Platelet Count 272 150 - 450 10e3/uL    % Neutrophils 82 %    % Lymphocytes 8 %    % Monocytes 8 %    % Eosinophils 0 %    % Basophils 1 %    % Immature Granulocytes 1 %    NRBCs per 100 WBC 0 <1 /100    Absolute Neutrophils 5.7 1.6 - 8.3 10e3/uL    Absolute Lymphocytes 0.6 (L) 0.8 - 5.3 10e3/uL    Absolute Monocytes 0.5 0.0 - 1.3 10e3/uL    Absolute Eosinophils 0.0 0.0 - 0.7 10e3/uL    Absolute Basophils 0.0 0.0 - 0.2 10e3/uL    Absolute Immature Granulocytes 0.1 <=0.4 10e3/uL    Absolute NRBCs 0.0 10e3/uL   Troponin T, High Sensitivity     Status: Normal   Result Value Ref Range    Troponin T, High Sensitivity 9 <=22 ng/L   EKG 12 lead     Status: None   Result Value Ref Range    Systolic Blood Pressure  mmHg    Diastolic Blood Pressure  mmHg    Ventricular Rate 89 BPM    Atrial Rate 89 BPM    SC Interval 156 ms    QRS Duration 92 ms     ms    QTc 462 ms    P Axis 27 degrees    R AXIS -31 degrees    T Axis -8 degrees    Interpretation ECG       Sinus rhythm  Left axis deviation  Abnormal ECG    Unconfirmed report - interpretation of this ECG is computer generated - see medical record for final interpretation  Confirmed by - EMERGENCY ROOM, PHYSICIAN ([id number]),  CHELITA BROOKE ([id number]) on 4/21/2024 9:47:45 AM     EKG 12-lead, tracing only     Status: None   Result Value Ref Range    Systolic Blood Pressure  mmHg    Diastolic Blood Pressure  mmHg    Ventricular Rate 92 BPM    Atrial Rate 92 BPM    SC Interval 170 ms    QRS Duration 88 ms     ms    QTc 467 ms    P Axis 25  degrees    R AXIS -38 degrees    T Axis -15 degrees    Interpretation ECG       Sinus rhythm  Left axis deviation  Nonspecific ST abnormality  Abnormal ECG  Unconfirmed report - interpretation of this ECG is computer generated - see medical record for final interpretation  Confirmed by - EMERGENCY ROOM, PHYSICIAN ([id number]),  SYDNI TRIANA ([id number]) on 4/22/2024 3:29:26 PM     CBC with platelets differential     Status: Abnormal    Narrative    The following orders were created for panel order CBC with platelets differential.  Procedure                               Abnormality         Status                     ---------                               -----------         ------                     CBC with platelets and d...[[id number]]  Abnormal            Final result                 Please view results for these tests on the individual orders.     Medications   iopamidol (ISOVUE-370) solution 100 mL (90 mLs Intravenous $Given 4/21/24 1006)   sodium chloride 0.9 % bag 100mL for CT scan flush use (53 mLs Intravenous $Given 4/21/24 1006)   hydrOXYzine HCl (ATARAX) tablet 25 mg (25 mg Oral $Given 4/21/24 1222)     Labs Ordered and Resulted from Time of ED Arrival to Time of ED Departure   COMPREHENSIVE METABOLIC PANEL - Abnormal       Result Value    Sodium 137      Potassium 3.9      Carbon Dioxide (CO2) 22      Anion Gap 10      Urea Nitrogen 14.6      Creatinine 0.79      GFR Estimate >90      Calcium 8.7 (*)     Chloride 105      Glucose 120 (*)     Alkaline Phosphatase 135      AST 32      ALT 78 (*)     Protein Total 6.7      Albumin 3.8      Bilirubin Total 0.9     CBC WITH PLATELETS AND DIFFERENTIAL - Abnormal    WBC Count 6.9      RBC Count 4.18 (*)     Hemoglobin 12.7 (*)     Hematocrit 37.8 (*)     MCV 90      MCH 30.4      MCHC 33.6      RDW 12.9      Platelet Count 272      % Neutrophils 82      % Lymphocytes 8      % Monocytes 8      % Eosinophils 0      % Basophils 1      % Immature Granulocytes 1       NRBCs per 100 WBC 0      Absolute Neutrophils 5.7      Absolute Lymphocytes 0.6 (*)     Absolute Monocytes 0.5      Absolute Eosinophils 0.0      Absolute Basophils 0.0      Absolute Immature Granulocytes 0.1      Absolute NRBCs 0.0     TROPONIN T, HIGH SENSITIVITY - Normal    Troponin T, High Sensitivity 10     INFLUENZA A/B, RSV, & SARS-COV2 PCR - Normal    Influenza A PCR Negative      Influenza B PCR Negative      RSV PCR Negative      SARS CoV2 PCR Negative     NT PROBNP INPATIENT - Normal    N terminal Pro BNP Inpatient 184     TROPONIN T, HIGH SENSITIVITY - Normal    Troponin T, High Sensitivity 9       CT Aortic Survey w Contrast   Final Result   IMPRESSION:   Small volume acute-appearing bilateral pulmonary emboli. No evidence of pulmonary infarction or right heart strain.      Discussed by telephone with  Dr. Swift at 11:05 AM on 04/21/2024.         POC US ECHO LIMITED   Final Result   Limited Bedside Cardiac Ultrasound, performed and interpreted by me.    Indication: Shortness of Breath.   Parasternal long axis, parasternal short axis, apical 4 chamber, and subcostal views were acquired.    Image quality was satisfactory.      Findings:     Global left ventricular function appears intact.   Chambers do not appear dilated.   There is no evidence of free fluid within the pericardium.   No septal bowing.      IMPRESSION: Grossly normal left ventricular function and chamber size.  No pericardial effusion.                    Critical care was performed.   Critical Care Addendum  My initial assessment, based on my review of nursing observations, review of vital signs, focused history, physical exam, review of cardiac rhythm monitor, 12 lead ECG analysis, and interpretation of CT , established a high suspicion that Js Hester has respiratory distress, which requires immediate intervention, and therefore he is critically ill.     After the initial assessment, the care team initiated multiple lab tests and  initiated medication therapy with anticoagulation  to provide stabilization care. Due to the critical nature of this patient, I reassessed nursing observations, vital signs, physical exam, review of cardiac rhythm monitor, mental status, neurologic status, and respiratory status multiple times prior to his disposition.     Time also spent performing documentation, discussion with family to obtain medical information for decision making, reviewing test results, discussion with consultants, and coordination of care.     Critical care time (excluding teaching time and procedures): 120 minutes.     Assessment & Plan    Patient presents to the emergency department with his wife for chief complaint of dyspnea that began last night, worsened with laying flat back.    Differential diagnosis includes but is not limited to pulm embolism, new diagnosis of heart failure, viral illness, aspiration pneumonia, pneumothorax, ACS, arrhythmia, enlarged aortic dilation.  ECG with normal sinus rhythm, heart rate 89.  No evidence of right heart strain.  Compared to ECG from 2015, no significant change seen.  POCUS without dilated right ventricle or IVC or septal bowing.  EF is grossly intact.    Negative COVID, influenza, RSV.  BNP and troponin x 2 not elevated.  CT PE positive for bilateral segmental and subsegmental pulmonary emboli.  Aortic dilation stable.  On reevaluation, patient felt quite dyspneic returning to his room from the bathroom, however did not desaturate.  He remained with heart rates in the 90s, respirations 20 or less, oxygen saturation greater than 95%.  Blood pressures remained elevated.  He was initiated on Xarelto (had risk/benefit discussion) and admitted under observation to medicine service for acute bilateral pulmonary embolism.    I have reviewed the nursing notes. I have reviewed the findings, diagnosis, plan and need for follow up with the patient.          Final diagnoses:   Multiple subsegmental  pulmonary emboli without acute cor pulmonale (H)       [facility], located within [facility] EMERGENCY DEPARTMENT  4/21/2024       Fariba Swift MD  04/22/24 1259

## 2024-04-21 NOTE — MEDICATION SCRIBE - ADMISSION MEDICATION HISTORY
Medication Scribe Admission Medication History    Admission medication history is complete. The information provided in this note is only as accurate as the sources available at the time of the update.    Information Source(s): Patient, Family member, Hospital records, and CareEverywhere/SureScripts via in-person    Pertinent Information: None.    Changes made to PTA medication list:  Added: None  Deleted: Ondansetron 4 mg Q 8 Hrs PRN, Senna-docusate 8.6-50 mg 1-2 tablets BID.  Changed: None    Allergies reviewed with patient and updates made in EHR: yes    Medication History Completed By: Ld Magana MD 4/21/2024 11:17 AM    PTA Med List   Medication Sig Last Dose    acetaminophen (TYLENOL) 325 MG tablet Take 2 tablets (650 mg) by mouth every 4 hours as needed for other (mild pain) 4/20/2024 at pm    aspirin 81 MG EC tablet Take 1 tablet (81 mg) by mouth 2 times daily 4/20/2024 at pm    ibuprofen (ADVIL/MOTRIN) 200 MG capsule Take 200-400 mg by mouth every 6 hours as needed for fever or mild pain 4/20/2024 at pm    melatonin 3 MG tablet Take 3 mg by mouth nightly as needed for sleep Past Week at unknown    oxyCODONE (ROXICODONE) 5 MG tablet Take 1-2 tablets (5-10 mg) by mouth every 4 hours as needed for moderate to severe pain 4/20/2024 at pm    polyethylene glycol (MIRALAX) 17 g packet Take 17 g by mouth daily 4/20/2024 at pm

## 2024-04-21 NOTE — ED TRIAGE NOTES
Patient presents with wife for SOB while sleeping after right hip arthroplasty on 4/11. He noticed at 2AM feeling difficulty with breathing and having to focus on taking deeper breaths. He feels discomfort or congestion in his chest when this happens. He reports it is worse laying down. No pain meds since last night. He did have a syncopal episode post surgery but was discharged same day. No asthma or blood clot history.      Triage Assessment (Adult)       Row Name 04/21/24 0735          Triage Assessment    Airway WDL WDL        Respiratory WDL    Respiratory WDL X;rhythm/pattern     Rhythm/Pattern, Respiratory shortness of breath        Skin Circulation/Temperature WDL    Skin Circulation/Temperature WDL WDL        Cardiac WDL    Cardiac WDL X;chest pain        Chest Pain Assessment    Chest Pain Location midsternal     Character other (see comments)  discomfort     Precipitating Factors nothing     Alleviating Factors other (comment)  deep breath     Associated Signs/Symptoms anxiety     Chest Pain Intervention 12-lead ECG obtained        Peripheral/Neurovascular WDL    Peripheral Neurovascular WDL WDL        Cognitive/Neuro/Behavioral WDL    Cognitive/Neuro/Behavioral WDL WDL        Lan Coma Scale    Best Eye Response 4-->(E4) spontaneous     Best Motor Response 6-->(M6) obeys commands     Best Verbal Response 5-->(V5) oriented     [place] Coma Scale Score 15

## 2024-04-22 LAB
ATRIAL RATE - MUSE: 92 BPM
DIASTOLIC BLOOD PRESSURE - MUSE: NORMAL MMHG
INTERPRETATION ECG - MUSE: NORMAL
P AXIS - MUSE: 25 DEGREES
PR INTERVAL - MUSE: 170 MS
QRS DURATION - MUSE: 88 MS
QT - MUSE: 378 MS
QTC - MUSE: 467 MS
R AXIS - MUSE: -38 DEGREES
SYSTOLIC BLOOD PRESSURE - MUSE: NORMAL MMHG
T AXIS - MUSE: -15 DEGREES
VENTRICULAR RATE- MUSE: 92 BPM

## 2024-04-23 ENCOUNTER — OFFICE VISIT (OUTPATIENT)
Dept: ORTHOPEDICS | Facility: CLINIC | Age: 69
End: 2024-04-23
Payer: COMMERCIAL

## 2024-04-23 DIAGNOSIS — Z48.89 ENCOUNTER FOR POSTOPERATIVE CARE: Primary | ICD-10-CM

## 2024-04-23 PROCEDURE — 99024 POSTOP FOLLOW-UP VISIT: CPT

## 2024-04-23 NOTE — PROGRESS NOTES
Chief Complaint:   Follow up, DOS 4/11/24 with Dr. Mendoza    Procedures:  Right total hip arthroplasty    History:  Js Hester is a 68 year old patient s/p above procedure, here for follow up. He has done well since surgery in terms of his hip. He was seen in the ED 4/21/24 for shortness of breath and found to have bilateral subsegmental pulmonary emboli despite ASA DVT prophylaxis and frequent exercise. No history of VTE. Vital signs remained stable throughout ED visit. Initiated on Xarelto with follow up with his PCP within 7 days. Alvin has been working with physical therapy and continues HEP at home. He is interested in advancing his activity. He has weaned from assistive device and opioid pain medication. Here with his wife, they do not have any additional concerns today.       Exam:     General: Awake, Alert, and oriented. Articulates and communicates with a normal affect     Right Lower Extremity:  Incisions well healed without evidence of infection, no erythema, drainage, or wound dehiscence   Normal post-operative effusion and ecchymosis  Range of motion and stability exam not performed  TA/Gsc/EHL/FHL with 5/5 strength  Distal pulses palpable, toes are warm and well perfused   Gait: Slightly antalgic gait without use of assistive device     Imaging:  No new imaging.     Medications:     Current Outpatient Medications:     ibuprofen (ADVIL/MOTRIN) 200 MG capsule, Take 200-400 mg by mouth every 6 hours as needed for fever or mild pain, Disp: , Rfl:     melatonin 3 MG tablet, Take 3 mg by mouth nightly as needed for sleep, Disp: , Rfl:     Rivaroxaban ANTICOAGULANT 15 & 20 MG TBPK Starter Therapy Pack, Take 15 mg by mouth 2 times daily (with meals) for 21 days, THEN 20 mg daily with food for 9 days., Disp: 51 each, Rfl: 0    acetaminophen (TYLENOL) 325 MG tablet, Take 2 tablets (650 mg) by mouth every 4 hours as needed for other (mild pain) (Patient not taking: Reported on 4/23/2024), Disp: 100 tablet,  Rfl: 0    hydrOXYzine HCl (ATARAX) 25 MG tablet, Take 2 tablets (50 mg) by mouth 3 times daily as needed for anxiety (Patient not taking: Reported on 4/23/2024), Disp: 30 tablet, Rfl: 0    oxyCODONE (ROXICODONE) 5 MG tablet, Take 1-2 tablets (5-10 mg) by mouth every 4 hours as needed for moderate to severe pain (Patient not taking: Reported on 4/23/2024), Disp: 26 tablet, Rfl: 0    polyethylene glycol (MIRALAX) 17 g packet, Take 17 g by mouth daily (Patient not taking: Reported on 4/23/2024), Disp: 7 packet, Rfl: 0    Assessment:  Doing well 2 weeks s/p right total hip arthroplasty with Dr. Mendoza. Basic wound cares were performed today, I did not appreciate signs of infection. We discussed the plan below, all questions were answered to the best of my ability.     Plan:   -Weight bearing: WBAT   -Posterior hip precautions x 12 weeks   -Follow with physical therapy focusing on gait and stability  -Anticoagulation per PCP   -Follow up: 6 weeks with Dr. Mendoza, new AP/cross table lateral     Alvina Orr PA-C 4/23/2024 3:05 PM  Orthopedic Surgery

## 2024-04-23 NOTE — LETTER
4/23/2024         RE: Js Hester  [address] [place] [address]        Dear Colleague,    Thank you for referring your patient, Js Hester, to the [facility] [place]. Please see a copy of my visit note below.    Chief Complaint:   Follow up, DOS 4/11/24 with Dr. Mendoza    Procedures:  Right total hip arthroplasty    History:  Js Hester is a 68 year old patient s/p above procedure, here for follow up. He has done well since surgery in terms of his hip. He was seen in the ED 4/21/24 for shortness of breath and found to have bilateral subsegmental pulmonary emboli despite ASA DVT prophylaxis and frequent exercise. No history of VTE. Vital signs remained stable throughout ED visit. Initiated on Xarelto with follow up with his PCP within 7 days. Alvin has been working with physical therapy and continues HEP at home. He is interested in advancing his activity. He has weaned from assistive device and opioid pain medication. Here with his wife, they do not have any additional concerns today.       Exam:     General: Awake, Alert, and oriented. Articulates and communicates with a normal affect     Right Lower Extremity:  Incisions well healed without evidence of infection, no erythema, drainage, or wound dehiscence   Normal post-operative effusion and ecchymosis  Range of motion and stability exam not performed  TA/Gsc/EHL/FHL with 5/5 strength  Distal pulses palpable, toes are warm and well perfused   Gait: Slightly antalgic gait without use of assistive device     Imaging:  No new imaging.     Medications:     Current Outpatient Medications:     ibuprofen (ADVIL/MOTRIN) 200 MG capsule, Take 200-400 mg by mouth every 6 hours as needed for fever or mild pain, Disp: , Rfl:     melatonin 3 MG tablet, Take 3 mg by mouth nightly as needed for sleep, Disp: , Rfl:     Rivaroxaban ANTICOAGULANT 15 & 20 MG TBPK Starter Therapy Pack, Take 15 mg by mouth 2 times daily  (with meals) for 21 days, THEN 20 mg daily with food for 9 days., Disp: 51 each, Rfl: 0    acetaminophen (TYLENOL) 325 MG tablet, Take 2 tablets (650 mg) by mouth every 4 hours as needed for other (mild pain) (Patient not taking: Reported on 4/23/2024), Disp: 100 tablet, Rfl: 0    hydrOXYzine HCl (ATARAX) 25 MG tablet, Take 2 tablets (50 mg) by mouth 3 times daily as needed for anxiety (Patient not taking: Reported on 4/23/2024), Disp: 30 tablet, Rfl: 0    oxyCODONE (ROXICODONE) 5 MG tablet, Take 1-2 tablets (5-10 mg) by mouth every 4 hours as needed for moderate to severe pain (Patient not taking: Reported on 4/23/2024), Disp: 26 tablet, Rfl: 0    polyethylene glycol (MIRALAX) 17 g packet, Take 17 g by mouth daily (Patient not taking: Reported on 4/23/2024), Disp: 7 packet, Rfl: 0    Assessment:  Doing well 2 weeks s/p right total hip arthroplasty with Dr. Mendoza. Basic wound cares were performed today, I did not appreciate signs of infection. We discussed the plan below, all questions were answered to the best of my ability.     Plan:   -Weight bearing: WBAT   -Posterior hip precautions x 12 weeks   -Follow with physical therapy focusing on gait and stability  -Anticoagulation per PCP   -Follow up: 6 weeks with Dr. Mendoza, new AP/cross table lateral     Alvina Orr PA-C 4/23/2024 3:05 PM  Orthopedic Surgery

## 2024-04-23 NOTE — NURSING NOTE
Reason For Visit:   Chief Complaint   Patient presents with    Surgical Followup     DOS: 4/11/24 Right KATIE with Dr. Mendoza       There were no vitals taken for this visit.    Pain Assessment  Patient Currently in Pain: Yes  0-10 Pain Scale: 3  Primary Pain Location: Hip (Right)    Jocelyn Almeida ATC

## 2024-05-02 ENCOUNTER — THERAPY VISIT (OUTPATIENT)
Dept: PHYSICAL THERAPY | Facility: CLINIC | Age: 69
End: 2024-05-02
Payer: COMMERCIAL

## 2024-05-02 DIAGNOSIS — Z96.641 STATUS POST TOTAL REPLACEMENT OF RIGHT HIP: ICD-10-CM

## 2024-05-02 DIAGNOSIS — M16.11 PRIMARY OSTEOARTHRITIS OF RIGHT HIP: Primary | ICD-10-CM

## 2024-05-02 PROCEDURE — 97530 THERAPEUTIC ACTIVITIES: CPT | Mod: GP | Performed by: PHYSICAL THERAPIST

## 2024-05-02 PROCEDURE — 97110 THERAPEUTIC EXERCISES: CPT | Mod: GP | Performed by: PHYSICAL THERAPIST

## 2024-05-13 DIAGNOSIS — Z96.641 STATUS POST TOTAL REPLACEMENT OF RIGHT HIP: Primary | ICD-10-CM

## 2024-05-19 ASSESSMENT — HOOS JR
RISING FROM SITTING: MILD
BENDING TO THE FLOOR TO PICK UP OBJECT: MILD
SITTING: MILD
WALKING ON UNEVEN SURFACE: MODERATE
HOOS JR TOTAL INTERVAL SCORE: 61.82
GOING UP OR DOWN STAIRS: MODERATE
LYING IN BED (TURNING OVER, MAINTAINING HIP POSITION): MODERATE

## 2024-05-23 ENCOUNTER — ANCILLARY PROCEDURE (OUTPATIENT)
Dept: GENERAL RADIOLOGY | Facility: CLINIC | Age: 69
End: 2024-05-23
Attending: ORTHOPAEDIC SURGERY
Payer: COMMERCIAL

## 2024-05-23 ENCOUNTER — OFFICE VISIT (OUTPATIENT)
Dept: ORTHOPEDICS | Facility: CLINIC | Age: 69
End: 2024-05-23
Payer: COMMERCIAL

## 2024-05-23 DIAGNOSIS — Z96.641 STATUS POST TOTAL REPLACEMENT OF RIGHT HIP: ICD-10-CM

## 2024-05-23 DIAGNOSIS — Z96.641 STATUS POST TOTAL REPLACEMENT OF RIGHT HIP: Primary | ICD-10-CM

## 2024-05-23 PROCEDURE — 99024 POSTOP FOLLOW-UP VISIT: CPT | Performed by: ORTHOPAEDIC SURGERY

## 2024-05-23 PROCEDURE — 73502 X-RAY EXAM HIP UNI 2-3 VIEWS: CPT | Mod: GC | Performed by: RADIOLOGY

## 2024-05-23 NOTE — NURSING NOTE
Reason For Visit:   Chief Complaint   Patient presents with    Surgical Followup     6 weeks s/p right KATIE. Did a little too much yard work last week and was sore the next day. But otherwise doing great.       There were no vitals taken for this visit.         Johana Cordero, ATC

## 2024-05-23 NOTE — PROGRESS NOTES
Chief Complaint: Surgical Followup (6 weeks s/p right KATIE. Did a little too much yard work last week and was sore the next day. But otherwise doing great.)    HPI: Js Hester returns today in follow-up for his right hip. He reports that he is doing well. Has been doing a lot of yard work and moving rocks. Taking tylenol for symptoms. No assist device. Leg initially felt long, now feels even. Had a post-op PE while on ASA therapy, seen in ED, started on Rivaroxaban. Reports is asymptomatic now. Has questions about hip precautions.     Current Status:  HOOS Jr: 61.82  [facility]:  Global Mental Health Score - (P) 16  Global Physical Health Score - (P) 15  PROMIS TOTAL - SUBSCORES - (P) 31  Medications and allergies are documented in the EMR and have been reviewed.      Current Outpatient Medications:     ibuprofen (ADVIL/MOTRIN) 200 MG capsule, Take 200-400 mg by mouth every 6 hours as needed for fever or mild pain, Disp: , Rfl:     melatonin 3 MG tablet, Take 3 mg by mouth nightly as needed for sleep, Disp: , Rfl:     Rivaroxaban ANTICOAGULANT 15 & 20 MG TBPK Starter Therapy Pack, Take 15 mg by mouth 2 times daily (with meals) for 21 days, THEN 20 mg daily with food for 9 days., Disp: 51 each, Rfl: 0    Allergies: Patient has no known allergies.    Physical Exam:  On physical examination the patient appears the stated age, is in no acute distress, affect is appropriate, and breathing is non-labored.    There were no vitals taken for this visit.  There is no height or weight on file to calculate BMI.    Rises from chair: easily  Gait: no cane today, normal gait  ROM:  fluid and painless with 90+ of flexion  Incisions is healed and benign  Sciatic motor function is 5/5 and symmetric     We reviewed the radiographs together. These show the normal progression for a total hip arthroplasty without evidence of loosening or subsidence.     Assessment: doing well 6 weeks s/p right total hip. Discussed PE at length and  reviewed his rivaroxaban taper. Discussed activities on total hip and the utility of hip precautions. Discussed follow-up. All the patient's questions were answered to the best of my ability.     Plan: RTC in 6 weeks, sooner if issues.     Referred Self

## 2024-05-23 NOTE — LETTER
5/23/2024         RE: Js Hester  [address] [place] [address]        Dear Colleague,    Thank you for referring your patient, Js Hester, to the [facility] [place]. Please see a copy of my visit note below.    Chief Complaint: Surgical Followup (6 weeks s/p right KATIE. Did a little too much yard work last week and was sore the next day. But otherwise doing great.)    HPI: Js Hester returns today in follow-up for his right hip. He reports that he is doing well. Has been doing a lot of yard work and moving rocks. Taking tylenol for symptoms. No assist device. Leg initially felt long, now feels even. Had a post-op PE while on ASA therapy, seen in ED, started on Rivaroxaban. Reports is asymptomatic now. Has questions about hip precautions.     Current Status:  RASHEEDA Jr: 61.82  [place]:  Global Mental Health Score - (P) 16  Global Physical Health Score - (P) 15  PROMIS TOTAL - SUBSCORES - (P) 31  Medications and allergies are documented in the EMR and have been reviewed.      Current Outpatient Medications:     ibuprofen (ADVIL/MOTRIN) 200 MG capsule, Take 200-400 mg by mouth every 6 hours as needed for fever or mild pain, Disp: , Rfl:     melatonin 3 MG tablet, Take 3 mg by mouth nightly as needed for sleep, Disp: , Rfl:     Rivaroxaban ANTICOAGULANT 15 & 20 MG TBPK Starter Therapy Pack, Take 15 mg by mouth 2 times daily (with meals) for 21 days, THEN 20 mg daily with food for 9 days., Disp: 51 each, Rfl: 0    Allergies: Patient has no known allergies.    Physical Exam:  On physical examination the patient appears the stated age, is in no acute distress, affect is appropriate, and breathing is non-labored.    There were no vitals taken for this visit.  There is no height or weight on file to calculate BMI.    Rises from chair: easily  Gait: no cane today, normal gait  ROM:  fluid and painless with 90+ of flexion  Incisions is healed and benign  Sciatic  motor function is 5/5 and symmetric     We reviewed the radiographs together. These show the normal progression for a total hip arthroplasty without evidence of loosening or subsidence.     Assessment: doing well 6 weeks s/p right total hip. Discussed PE at length and reviewed his rivaroxaban taper. Discussed activities on total hip and the utility of hip precautions. Discussed follow-up. All the patient's questions were answered to the best of my ability.     Plan: RTC in 6 weeks, sooner if issues.     Referred Self        Marvin Mendoza MD

## 2024-07-07 ASSESSMENT — HOOS JR
SITTING: MILD
RISING FROM SITTING: MILD
BENDING TO THE FLOOR TO PICK UP OBJECT: MILD
GOING UP OR DOWN STAIRS: MILD
LYING IN BED (TURNING OVER, MAINTAINING HIP POSITION): MILD
HOOS JR TOTAL INTERVAL SCORE: 70.43
WALKING ON UNEVEN SURFACE: MILD

## 2024-07-11 ENCOUNTER — OFFICE VISIT (OUTPATIENT)
Dept: ORTHOPEDICS | Facility: CLINIC | Age: 69
End: 2024-07-11
Payer: COMMERCIAL

## 2024-07-11 DIAGNOSIS — Z96.641 STATUS POST TOTAL REPLACEMENT OF RIGHT HIP: Primary | ICD-10-CM

## 2024-07-11 PROCEDURE — 99212 OFFICE O/P EST SF 10 MIN: CPT | Performed by: ORTHOPAEDIC SURGERY

## 2024-07-11 NOTE — NURSING NOTE
Reason For Visit:   Chief Complaint   Patient presents with    Surgical Followup     12 weeks s/p Right KATIE. Doing well. Some sharp pain on the 18th hole 10 days ago. First time playing 18 holes since surgery. Better now. A tiny bit sore, but ok.       There were no vitals taken for this visit.         Johana Cordero, ATC

## 2024-07-11 NOTE — LETTER
7/11/2024      Js Hester  [address] [place] [address]      Dear Colleague,    Thank you for referring your patient, Js Hester, to the [facility] [place]. Please see a copy of my visit note below.    Chief Complaint: Surgical Followup (12 weeks s/p Right KATIE. Doing well. Some sharp pain on the 18th hole 10 days ago. First time playing 18 holes since surgery. Better now. A tiny bit sore, but ok.)         HPI: Js Hester returns today in follow-up for his right total hip.  Reports he is doing well.  He is gone back to his desired activities.  He has some sharp pains 2 weeks ago while he was golfing (this was on the 18th hole) he took some ibuprofen afterwards and it resolved.  He said no symptoms since that time he has questions about activities blood clots and follow-up.    Pain medication: No  Assist device: No  Physical therapy: Done  Returned to work: Not applicable    Current Status:  RASHEEDA Caballero: Not done today  [facility]:  Global Mental Health Score - (P) 17  Global Physical Health Score - (P) 17  PROMIS TOTAL - SUBSCORES - (P) 34  Medications and allergies are documented in the EMR and have been reviewed.      Current Outpatient Medications:     ibuprofen (ADVIL/MOTRIN) 200 MG capsule, Take 200-400 mg by mouth every 6 hours as needed for fever or mild pain, Disp: , Rfl:     melatonin 3 MG tablet, Take 3 mg by mouth nightly as needed for sleep, Disp: , Rfl:     Rivaroxaban ANTICOAGULANT 15 & 20 MG TBPK Starter Therapy Pack, Take 15 mg by mouth 2 times daily (with meals) for 21 days, THEN 20 mg daily with food for 9 days., Disp: 51 each, Rfl: 0    Allergies: Patient has no known allergies.        Physical Exam:  On physical examination the patient appears the stated age, is in no acute distress, affect is appropriate, and breathing is non-labored.    There were no vitals taken for this visit.  There is no height or weight on file to calculate  BMI.    Rises from chair: Easily  Gait: Normal    Assessment: Doing well 12 weeks status post total hip.  We discussed activities on total hip.  We discussed the utility of posterior precautions and what this can and cannot do.  We discussed that it is okay to soak now.  We discussed his anticoagulant for (he is completing his course of apixaban).  We discussed follow-up.  All the patient's questions were answered to the best of my ability.      Plan: Follow-up in 12 months.  Seen the patient was  Herson Benjamin      Again, thank you for allowing me to participate in the care of your patient.      Sincerely,        Marvin Mendoza MD

## 2024-07-11 NOTE — PROGRESS NOTES
Chief Complaint: Surgical Followup (12 weeks s/p Right KATIE. Doing well. Some sharp pain on the 18th hole 10 days ago. First time playing 18 holes since surgery. Better now. A tiny bit sore, but ok.)         HPI: Js Hester returns today in follow-up for his right total hip.  Reports he is doing well.  He is gone back to his desired activities.  He has some sharp pains 2 weeks ago while he was golfing (this was on the 18th hole) he took some ibuprofen afterwards and it resolved.  He said no symptoms since that time he has questions about activities blood clots and follow-up.    Pain medication: No  Assist device: No  Physical therapy: Done  Returned to work: Not applicable    Current Status:  RASHEEDA Caballero: Not done today  [facility]:  Global Mental Health Score - (P) 17  Global Physical Health Score - (P) 17  PROMIS TOTAL - SUBSCORES - (P) 34  Medications and allergies are documented in the EMR and have been reviewed.      Current Outpatient Medications:     ibuprofen (ADVIL/MOTRIN) 200 MG capsule, Take 200-400 mg by mouth every 6 hours as needed for fever or mild pain, Disp: , Rfl:     melatonin 3 MG tablet, Take 3 mg by mouth nightly as needed for sleep, Disp: , Rfl:     Rivaroxaban ANTICOAGULANT 15 & 20 MG TBPK Starter Therapy Pack, Take 15 mg by mouth 2 times daily (with meals) for 21 days, THEN 20 mg daily with food for 9 days., Disp: 51 each, Rfl: 0    Allergies: Patient has no known allergies.        Physical Exam:  On physical examination the patient appears the stated age, is in no acute distress, affect is appropriate, and breathing is non-labored.    There were no vitals taken for this visit.  There is no height or weight on file to calculate BMI.    Rises from chair: Easily  Gait: Normal    Assessment: Doing well 12 weeks status post total hip.  We discussed activities on total hip.  We discussed the utility of posterior precautions and what this can and cannot do.  We discussed that it is okay to soak now.   We discussed his anticoagulant for (he is completing his course of apixaban).  We discussed follow-up.  All the patient's questions were answered to the best of my ability.      Plan: Follow-up in 12 months.  Seen the patient was  Herson Benjamin

## 2024-07-13 ENCOUNTER — HEALTH MAINTENANCE LETTER (OUTPATIENT)
Age: 69
End: 2024-07-13

## 2025-07-19 ENCOUNTER — HEALTH MAINTENANCE LETTER (OUTPATIENT)
Age: 70
End: 2025-07-19

## (undated) DEVICE — BLADE KNIFE SURG 20 371120

## (undated) DEVICE — DRILL BIT FLEXIBLE REFLECTION 35MM 71362935

## (undated) DEVICE — POSITIONER ABDUCTION PILLOW FOAM MED FP-ABDUCTM

## (undated) DEVICE — DEVICE RETRIEVER HEWSON 71111579

## (undated) DEVICE — PACK TOTAL HIP W/POUCH RIVERSIDE LATEX FREE

## (undated) DEVICE — GLOVE BIOGEL PI SZ 7.5 40875

## (undated) DEVICE — DRAPE STERI TOWEL LG 1010

## (undated) DEVICE — STRAP KNEE/BODY 31143004

## (undated) DEVICE — DRSG TEGADERM 4X10" 1627

## (undated) DEVICE — DRSG KERLIX 4 1/2"X4YDS ROLL 6730

## (undated) DEVICE — SU DERMABOND ADVANCED .7ML DNX12

## (undated) DEVICE — SUCTION IRR SYSTEM W/O TIP INTERPULSE HANDPIECE 0210-100-000

## (undated) DEVICE — LINEN BACK PACK 5440

## (undated) DEVICE — HOOD SURG T7PLUS PEEL AWAY FACE SHIELD STRL LF 0416-801-100

## (undated) DEVICE — DECANTER VIAL 2006S

## (undated) DEVICE — GLOVE BIOGEL PI MICRO INDICATOR UNDERGLOVE SZ 8.0 48980

## (undated) DEVICE — LINEN MAYO STAND COVER OVERSIZE PACK 5458

## (undated) DEVICE — SOL NACL 0.9% IRRIG 1000ML BOTTLE 2F7124

## (undated) DEVICE — GOWN IMPERVIOUS SPECIALTY XLG/XLONG 32474

## (undated) DEVICE — BONE CLEANING TIP INTERPULSE  0210-010-000

## (undated) DEVICE — STRAP STIRRUP W/SLIP 30187-030

## (undated) DEVICE — SU MONOCRYL 3-0 PS-1 27" Y936H

## (undated) DEVICE — DRSG STERI STRIP 1/2X4" R1547

## (undated) DEVICE — SUCTION MANIFOLD NEPTUNE 2 SYS 4 PORT 0702-020-000

## (undated) DEVICE — PREP CHLORAPREP 26ML TINTED HI-LITE ORANGE 930815

## (undated) DEVICE — SOL WATER IRRIG 1000ML BOTTLE 2F7114

## (undated) DEVICE — SOL NACL 0.9% INJ 1000ML BAG 2B1324X

## (undated) DEVICE — DRAPE IOBAN INCISE 36X23" 6651EZ

## (undated) DEVICE — ESU PENCIL W/SMOKE EVAC NEPTUNE STRYKER 0703-046-000

## (undated) DEVICE — SU ETHIBOND 5 V-37 4X30" MB66G

## (undated) DEVICE — SU VICRYL 0 CT 36" J358H

## (undated) DEVICE — LINEN TOWEL PACK X5 5464

## (undated) DEVICE — ESU GROUND PAD ADULT W/CORD E7507

## (undated) DEVICE — SU VICRYL 2-0 CT-1 27" UND J259H

## (undated) DEVICE — BLADE SAW RECIP STRK 70X6X0.025MM 0277-096-250S5

## (undated) DEVICE — DRSG TEGADERM ALGINATE AG 4X5" 90303

## (undated) DEVICE — SPONGE LAP 18X18" X8435

## (undated) RX ORDER — HYDROMORPHONE HYDROCHLORIDE 1 MG/ML
INJECTION, SOLUTION INTRAMUSCULAR; INTRAVENOUS; SUBCUTANEOUS
Status: DISPENSED
Start: 2024-04-11

## (undated) RX ORDER — KETOROLAC TROMETHAMINE 30 MG/ML
INJECTION, SOLUTION INTRAMUSCULAR; INTRAVENOUS
Status: DISPENSED
Start: 2024-04-11

## (undated) RX ORDER — EPHEDRINE SULFATE 50 MG/ML
INJECTION, SOLUTION INTRAMUSCULAR; INTRAVENOUS; SUBCUTANEOUS
Status: DISPENSED
Start: 2024-04-11

## (undated) RX ORDER — TRANEXAMIC ACID 650 MG/1
TABLET ORAL
Status: DISPENSED
Start: 2024-04-11

## (undated) RX ORDER — GLYCOPYRROLATE 0.2 MG/ML
INJECTION, SOLUTION INTRAMUSCULAR; INTRAVENOUS
Status: DISPENSED
Start: 2024-04-11

## (undated) RX ORDER — CEFAZOLIN SODIUM/WATER 2 G/20 ML
SYRINGE (ML) INTRAVENOUS
Status: DISPENSED
Start: 2024-04-11

## (undated) RX ORDER — ACETAMINOPHEN 325 MG/1
TABLET ORAL
Status: DISPENSED
Start: 2024-04-11

## (undated) RX ORDER — FENTANYL CITRATE 0.05 MG/ML
INJECTION, SOLUTION INTRAMUSCULAR; INTRAVENOUS
Status: DISPENSED
Start: 2024-04-11

## (undated) RX ORDER — ONDANSETRON 2 MG/ML
INJECTION INTRAMUSCULAR; INTRAVENOUS
Status: DISPENSED
Start: 2024-04-11

## (undated) RX ORDER — FENTANYL CITRATE-0.9 % NACL/PF 10 MCG/ML
PLASTIC BAG, INJECTION (ML) INTRAVENOUS
Status: DISPENSED
Start: 2024-04-11

## (undated) RX ORDER — PROPOFOL 10 MG/ML
INJECTION, EMULSION INTRAVENOUS
Status: DISPENSED
Start: 2024-04-11

## (undated) RX ORDER — DEXAMETHASONE SODIUM PHOSPHATE 4 MG/ML
INJECTION, SOLUTION INTRA-ARTICULAR; INTRALESIONAL; INTRAMUSCULAR; INTRAVENOUS; SOFT TISSUE
Status: DISPENSED
Start: 2024-04-11

## (undated) RX ORDER — OXYCODONE HYDROCHLORIDE 5 MG/1
TABLET ORAL
Status: DISPENSED
Start: 2024-04-11